# Patient Record
Sex: MALE | Race: WHITE | Employment: OTHER | URBAN - NONMETROPOLITAN AREA
[De-identification: names, ages, dates, MRNs, and addresses within clinical notes are randomized per-mention and may not be internally consistent; named-entity substitution may affect disease eponyms.]

---

## 2017-04-23 ENCOUNTER — TRANSFERRED RECORDS (OUTPATIENT)
Dept: HEALTH INFORMATION MANAGEMENT | Facility: HOSPITAL | Age: 57
End: 2017-04-23

## 2017-04-23 ENCOUNTER — HISTORY (OUTPATIENT)
Dept: EMERGENCY MEDICINE | Facility: OTHER | Age: 57
End: 2017-04-23

## 2017-04-23 ENCOUNTER — AMBULATORY - GICH (OUTPATIENT)
Dept: SCHEDULING | Facility: OTHER | Age: 57
End: 2017-04-23

## 2017-04-25 ENCOUNTER — AMBULATORY - GICH (OUTPATIENT)
Dept: SCHEDULING | Facility: OTHER | Age: 57
End: 2017-04-25

## 2017-04-27 ENCOUNTER — TRANSFERRED RECORDS (OUTPATIENT)
Dept: HEALTH INFORMATION MANAGEMENT | Facility: HOSPITAL | Age: 57
End: 2017-04-27

## 2017-04-27 ENCOUNTER — HOSPITAL ENCOUNTER (EMERGENCY)
Facility: HOSPITAL | Age: 57
Discharge: HOME | End: 2017-04-27
Attending: EMERGENCY MEDICINE | Admitting: EMERGENCY MEDICINE
Payer: COMMERCIAL

## 2017-04-27 VITALS
OXYGEN SATURATION: 96 % | HEART RATE: 98 BPM | SYSTOLIC BLOOD PRESSURE: 111 MMHG | RESPIRATION RATE: 18 BRPM | DIASTOLIC BLOOD PRESSURE: 64 MMHG | TEMPERATURE: 97.7 F | WEIGHT: 151 LBS

## 2017-04-27 DIAGNOSIS — Z72.0 TOBACCO ABUSE: ICD-10-CM

## 2017-04-27 DIAGNOSIS — R58 ECCHYMOSIS: ICD-10-CM

## 2017-04-27 DIAGNOSIS — R06.09 DYSPNEA ON EXERTION: ICD-10-CM

## 2017-04-27 DIAGNOSIS — J44.9 CHRONIC OBSTRUCTIVE PULMONARY DISEASE, UNSPECIFIED COPD TYPE (H): ICD-10-CM

## 2017-04-27 LAB
ALBUMIN SERPL-MCNC: 3.1 G/DL (ref 3.4–5)
ALP SERPL-CCNC: 74 U/L (ref 40–150)
ALT SERPL W P-5'-P-CCNC: 23 U/L (ref 0–70)
ANION GAP SERPL CALCULATED.3IONS-SCNC: 11 MMOL/L (ref 3–14)
AST SERPL W P-5'-P-CCNC: 20 U/L (ref 0–45)
BASE DEFICIT BLDA-SCNC: 1.6 MMOL/L
BASOPHILS # BLD AUTO: 0 10E9/L (ref 0–0.2)
BASOPHILS NFR BLD AUTO: 0.3 %
BILIRUB SERPL-MCNC: 1 MG/DL (ref 0.2–1.3)
BUN SERPL-MCNC: 18 MG/DL (ref 7–30)
CALCIUM SERPL-MCNC: 9.3 MG/DL (ref 8.5–10.1)
CHLORIDE SERPL-SCNC: 101 MMOL/L (ref 94–109)
CO2 SERPL-SCNC: 22 MMOL/L (ref 20–32)
CREAT SERPL-MCNC: 1.28 MG/DL (ref 0.66–1.25)
CRP SERPL-MCNC: 34.1 MG/L (ref 0–8)
D DIMER PPP DDU-MCNC: 553 NG/ML D-DU (ref 0–300)
DIFFERENTIAL METHOD BLD: ABNORMAL
EOSINOPHIL # BLD AUTO: 0 10E9/L (ref 0–0.7)
EOSINOPHIL NFR BLD AUTO: 0.5 %
ERYTHROCYTE [DISTWIDTH] IN BLOOD BY AUTOMATED COUNT: 12.5 % (ref 10–15)
ERYTHROCYTE [SEDIMENTATION RATE] IN BLOOD BY WESTERGREN METHOD: 47 MM/H (ref 0–20)
GFR SERPL CREATININE-BSD FRML MDRD: 58 ML/MIN/1.7M2
GLUCOSE SERPL-MCNC: 100 MG/DL (ref 70–99)
HCO3 BLD-SCNC: 20 MMOL/L (ref 21–28)
HCT VFR BLD AUTO: 39.1 % (ref 40–53)
HGB BLD-MCNC: 13.5 G/DL (ref 13.3–17.7)
IMM GRANULOCYTES # BLD: 0 10E9/L (ref 0–0.4)
IMM GRANULOCYTES NFR BLD: 0.5 %
LYMPHOCYTES # BLD AUTO: 1.3 10E9/L (ref 0.8–5.3)
LYMPHOCYTES NFR BLD AUTO: 14.4 %
MAGNESIUM SERPL-MCNC: 2.3 MG/DL (ref 1.6–2.3)
MCH RBC QN AUTO: 30.9 PG (ref 26.5–33)
MCHC RBC AUTO-ENTMCNC: 34.5 G/DL (ref 31.5–36.5)
MCV RBC AUTO: 90 FL (ref 78–100)
MONOCYTES # BLD AUTO: 0.6 10E9/L (ref 0–1.3)
MONOCYTES NFR BLD AUTO: 7 %
NEUTROPHILS # BLD AUTO: 6.9 10E9/L (ref 1.6–8.3)
NEUTROPHILS NFR BLD AUTO: 77.3 %
NRBC # BLD AUTO: 0 10*3/UL
NRBC BLD AUTO-RTO: 0 /100
NT-PROBNP SERPL-MCNC: 393 PG/ML (ref 0–900)
O2/TOTAL GAS SETTING VFR VENT: ABNORMAL %
OXYHGB MFR BLD: 95 % (ref 92–100)
PCO2 BLD: 26 MM HG (ref 35–45)
PH BLD: 7.49 PH (ref 7.35–7.45)
PLATELET # BLD AUTO: 325 10E9/L (ref 150–450)
PO2 BLD: 74 MM HG (ref 80–105)
POTASSIUM SERPL-SCNC: 4.4 MMOL/L (ref 3.4–5.3)
PROT SERPL-MCNC: 7.5 G/DL (ref 6.8–8.8)
RBC # BLD AUTO: 4.37 10E12/L (ref 4.4–5.9)
SODIUM SERPL-SCNC: 134 MMOL/L (ref 133–144)
TROPONIN I SERPL-MCNC: NORMAL UG/L (ref 0–0.04)
WBC # BLD AUTO: 8.9 10E9/L (ref 4–11)

## 2017-04-27 PROCEDURE — 25000128 H RX IP 250 OP 636: Performed by: EMERGENCY MEDICINE

## 2017-04-27 PROCEDURE — 93005 ELECTROCARDIOGRAM TRACING: CPT

## 2017-04-27 PROCEDURE — 93010 ELECTROCARDIOGRAM REPORT: CPT | Performed by: INTERNAL MEDICINE

## 2017-04-27 PROCEDURE — 86140 C-REACTIVE PROTEIN: CPT | Performed by: EMERGENCY MEDICINE

## 2017-04-27 PROCEDURE — 94640 AIRWAY INHALATION TREATMENT: CPT | Mod: 76

## 2017-04-27 PROCEDURE — 71020 ZZHC CHEST TWO VIEWS, FRONT/LAT: CPT | Mod: TC

## 2017-04-27 PROCEDURE — 83880 ASSAY OF NATRIURETIC PEPTIDE: CPT | Performed by: EMERGENCY MEDICINE

## 2017-04-27 PROCEDURE — 83735 ASSAY OF MAGNESIUM: CPT | Performed by: EMERGENCY MEDICINE

## 2017-04-27 PROCEDURE — 36415 COLL VENOUS BLD VENIPUNCTURE: CPT | Performed by: EMERGENCY MEDICINE

## 2017-04-27 PROCEDURE — 36600 WITHDRAWAL OF ARTERIAL BLOOD: CPT

## 2017-04-27 PROCEDURE — 85379 FIBRIN DEGRADATION QUANT: CPT | Performed by: EMERGENCY MEDICINE

## 2017-04-27 PROCEDURE — 96375 TX/PRO/DX INJ NEW DRUG ADDON: CPT | Mod: 59

## 2017-04-27 PROCEDURE — 25000132 ZZH RX MED GY IP 250 OP 250 PS 637: Performed by: EMERGENCY MEDICINE

## 2017-04-27 PROCEDURE — 84484 ASSAY OF TROPONIN QUANT: CPT | Performed by: EMERGENCY MEDICINE

## 2017-04-27 PROCEDURE — 94640 AIRWAY INHALATION TREATMENT: CPT

## 2017-04-27 PROCEDURE — 82805 BLOOD GASES W/O2 SATURATION: CPT | Performed by: EMERGENCY MEDICINE

## 2017-04-27 PROCEDURE — 25000125 ZZHC RX 250: Performed by: EMERGENCY MEDICINE

## 2017-04-27 PROCEDURE — 71275 CT ANGIOGRAPHY CHEST: CPT | Mod: TC

## 2017-04-27 PROCEDURE — 40000275 ZZH STATISTIC RCP TIME EA 10 MIN

## 2017-04-27 PROCEDURE — 99285 EMERGENCY DEPT VISIT HI MDM: CPT | Performed by: EMERGENCY MEDICINE

## 2017-04-27 PROCEDURE — 94664 DEMO&/EVAL PT USE INHALER: CPT

## 2017-04-27 PROCEDURE — 96374 THER/PROPH/DIAG INJ IV PUSH: CPT | Mod: 59

## 2017-04-27 PROCEDURE — 85652 RBC SED RATE AUTOMATED: CPT | Performed by: EMERGENCY MEDICINE

## 2017-04-27 PROCEDURE — 99285 EMERGENCY DEPT VISIT HI MDM: CPT | Mod: 25

## 2017-04-27 PROCEDURE — 85025 COMPLETE CBC W/AUTO DIFF WBC: CPT | Performed by: EMERGENCY MEDICINE

## 2017-04-27 PROCEDURE — 80053 COMPREHEN METABOLIC PANEL: CPT | Performed by: EMERGENCY MEDICINE

## 2017-04-27 RX ORDER — PREDNISONE 20 MG/1
TABLET ORAL
Qty: 10 TABLET | Refills: 0 | Status: SHIPPED | OUTPATIENT
Start: 2017-04-27 | End: 2017-05-17

## 2017-04-27 RX ORDER — DIAZEPAM 10 MG/2ML
5 INJECTION, SOLUTION INTRAMUSCULAR; INTRAVENOUS ONCE
Status: COMPLETED | OUTPATIENT
Start: 2017-04-27 | End: 2017-04-27

## 2017-04-27 RX ORDER — IOPAMIDOL 755 MG/ML
75 INJECTION, SOLUTION INTRAVASCULAR ONCE
Status: COMPLETED | OUTPATIENT
Start: 2017-04-27 | End: 2017-04-27

## 2017-04-27 RX ORDER — TRAMADOL HYDROCHLORIDE 50 MG/1
50 TABLET ORAL EVERY 8 HOURS PRN
Qty: 20 TABLET | Refills: 0 | Status: SHIPPED | OUTPATIENT
Start: 2017-04-27 | End: 2017-05-17

## 2017-04-27 RX ORDER — IPRATROPIUM BROMIDE AND ALBUTEROL SULFATE 2.5; .5 MG/3ML; MG/3ML
3 SOLUTION RESPIRATORY (INHALATION) ONCE
Status: COMPLETED | OUTPATIENT
Start: 2017-04-27 | End: 2017-04-27

## 2017-04-27 RX ORDER — ALBUTEROL SULFATE 90 UG/1
2 AEROSOL, METERED RESPIRATORY (INHALATION) EVERY 4 HOURS PRN
Qty: 1 INHALER | Refills: 0 | Status: ON HOLD | OUTPATIENT
Start: 2017-04-27 | End: 2017-05-24

## 2017-04-27 RX ORDER — METHYLPREDNISOLONE SODIUM SUCCINATE 125 MG/2ML
125 INJECTION, POWDER, LYOPHILIZED, FOR SOLUTION INTRAMUSCULAR; INTRAVENOUS ONCE
Status: COMPLETED | OUTPATIENT
Start: 2017-04-27 | End: 2017-04-27

## 2017-04-27 RX ORDER — LIDOCAINE 40 MG/G
CREAM TOPICAL
Status: DISCONTINUED | OUTPATIENT
Start: 2017-04-27 | End: 2017-04-27 | Stop reason: HOSPADM

## 2017-04-27 RX ORDER — ALBUTEROL SULFATE 0.83 MG/ML
2.5 SOLUTION RESPIRATORY (INHALATION)
Status: DISCONTINUED | OUTPATIENT
Start: 2017-04-27 | End: 2017-04-27 | Stop reason: HOSPADM

## 2017-04-27 RX ORDER — ALBUTEROL SULFATE 90 UG/1
2 AEROSOL, METERED RESPIRATORY (INHALATION) EVERY 6 HOURS PRN
Status: DISCONTINUED | OUTPATIENT
Start: 2017-04-27 | End: 2017-04-27 | Stop reason: HOSPADM

## 2017-04-27 RX ADMIN — IPRATROPIUM BROMIDE AND ALBUTEROL SULFATE 3 ML: .5; 3 SOLUTION RESPIRATORY (INHALATION) at 14:19

## 2017-04-27 RX ADMIN — METHYLPREDNISOLONE SODIUM SUCCINATE 125 MG: 125 INJECTION, POWDER, FOR SOLUTION INTRAMUSCULAR; INTRAVENOUS at 15:56

## 2017-04-27 RX ADMIN — ALBUTEROL SULFATE 2 PUFF: 90 AEROSOL, METERED RESPIRATORY (INHALATION) at 16:01

## 2017-04-27 RX ADMIN — DIAZEPAM 5 MG: 5 INJECTION, SOLUTION INTRAMUSCULAR; INTRAVENOUS at 14:06

## 2017-04-27 RX ADMIN — IOPAMIDOL 75 ML: 755 INJECTION, SOLUTION INTRAVASCULAR at 15:01

## 2017-04-27 RX ADMIN — ALBUTEROL SULFATE 2.5 MG: 2.5 SOLUTION RESPIRATORY (INHALATION) at 14:19

## 2017-04-27 ASSESSMENT — ENCOUNTER SYMPTOMS
NERVOUS/ANXIOUS: 1
LIGHT-HEADEDNESS: 1
FATIGUE: 1
GASTROINTESTINAL NEGATIVE: 1
AGITATION: 1
DYSPHORIC MOOD: 1
APPETITE CHANGE: 1
CHEST TIGHTNESS: 1
WHEEZING: 1
EYES NEGATIVE: 1
SHORTNESS OF BREATH: 1
BRUISES/BLEEDS EASILY: 1
WEAKNESS: 1
ACTIVITY CHANGE: 1
MYALGIAS: 1

## 2017-04-27 NOTE — ED NOTES
Explained to pt and son that prednisone and inhaler prescriptions were sent to [facility].  Paper prescription given to pt for tramadol.

## 2017-04-27 NOTE — ED PROVIDER NOTES
"  History     Chief Complaint   Patient presents with     Shortness of Breath     [facility] states SOB became much worse today while in shower, statesw \"blackness on my legs is way worse today,\" recent hospitalization in [facility]  Abelardo Escobar is a 56 year old male who was [facility] with the above hx.  He had been referred from [facility] on 4/23 and discharged on 4/25 from [facility].  He had painful ecchymosis on both of his medial thighs, atypical chest pain with elevated trop, and SOB which he attributed to his legs. Bilateral USs were negative for DVTs.  He had ANDREIA and cardiolite stress tests. He had a hyperdynamic LV (EF 85%) and some moderate AS so was placed on metoprolol, prilosec for possible GERD, and thiamine. [facility] at [facility] after this primarily cardiac workup so was referred to our ED for further eval.       I have reviewed the Medications, Allergies, Past Medical and Surgical History, and Social History in the Epic system.    Review of Systems   Constitutional: Positive for activity change, appetite change and fatigue.   HENT: Negative.    Eyes: Negative.    Respiratory: Positive for chest tightness, shortness of breath and wheezing.    Cardiovascular: Positive for chest pain.   Gastrointestinal: Negative.    Genitourinary: Negative.    Musculoskeletal: Positive for myalgias.   Neurological: Positive for weakness and light-headedness.   Hematological: Bruises/bleeds easily.   Psychiatric/Behavioral: Positive for agitation and dysphoric mood. The patient is nervous/anxious.      Physical Exam   BP: 149/57  Heart Rate: 98  Temp: 97.1  F (36.2  C)  Resp: 26  Weight: 68.5 kg (151 lb)  SpO2: 98 %  Physical Exam   Constitutional: He appears well-developed and well-nourished. He appears distressed.   Angry frustrated tanned fellow who appears older than stated age.     HENT:   Head: Normocephalic and " atraumatic.   Eyes: Conjunctivae and EOM are normal. Pupils are equal, round, and reactive to light.   Neck: Normal range of motion. Neck supple.   Cardiovascular: Normal rate and regular rhythm.    Pulmonary/Chest: He is in respiratory distress. He has wheezes.   Abdominal: Soft. Bowel sounds are normal. He exhibits no distension. There is no tenderness. There is no rebound.   Musculoskeletal: Normal range of motion. He exhibits no edema, tenderness or deformity.   Neurological: He is alert.   Skin: Skin is warm. He is not diaphoretic.   Ecchymotic patches along the adductor side of the thighs that do not feel like hematomas but have leeched under the skin somewhat.      Psychiatric:   Cantankerous somewhat combative but because his son was present he usually seemed to gain insight and settle down.      ED Course     ED Course     Procedures  ECG  Sinus rhythm with PACs, otherwise normal ECG, QTc 440 ms  Critical Care time:  none    Labs Ordered and Resulted from Time of ED Arrival Up to the Time of Departure from the ED   CBC WITH PLATELETS DIFFERENTIAL - Abnormal; Notable for the following:        Result Value    RBC Count 4.37 (*)     Hematocrit 39.1 (*)     All other components within normal limits   D-DIMER (FV RANGE) - Abnormal; Notable for the following:     D-Dimer ng/mL 553 (*)     All other components within normal limits   ERYTHROCYTE SEDIMENTATION RATE AUTO - Abnormal; Notable for the following:     Sed Rate 47 (*)     All other components within normal limits   CRP INFLAMMATION - Abnormal; Notable for the following:     CRP Inflammation 34.1 (*)     All other components within normal limits   COMPREHENSIVE METABOLIC PANEL - Abnormal; Notable for the following:     Glucose 100 (*)     Creatinine 1.28 (*)     GFR Estimate 58 (*)     Albumin 3.1 (*)     All other components within normal limits   BLOOD GAS ARTERIAL AND OXYHGB - Abnormal; Notable for the following:     pH Arterial 7.49 (*)     pCO2  Arterial 26 (*)     pO2 Arterial 74 (*)     Bicarbonate Arterial 20 (*)     All other components within normal limits   MAGNESIUM   TROPONIN I   NT PROBNP INPATIENT   PERIPHERAL IV CATHETER     Assessments & Plan (with Medical Decision Making)   Abelardo had a recent thorough efficient 2 day admission for his heart eval re CAD but was not felt to be a candidate on the evidence for a cath.  Noncardiac causes of chest pain were not acted upon.  Today and IV was placed and labs obtained which some acute hyperventilation numbers, d-dimer was elevated at 553 along with ESR of of 47 and CRP of 34.  Creat of 1.28 with GFR of 58 was reassuring.  Elected pulm CTA to rule out pulm embolism which was cleared.  Because of the decreased PEFR both before and after a sequence of nebs, it was felt that his 2+ ppd smoking had finally caught up with him causing his dyspnea.  Solumedrol 125mg IV given along valium 5mg for some attread which seemed to help.  Instructed on albuterol MDI and given follow up prednisone bolus for 5 days.  Tramadol given for his painful leg ecchymoses?? The exact etiology of which are unclear.  He is referred back to the [facility] NP Safia with whom he seems to have bonded. A pulmonary workup including complete PFTs and smoking cessation all would be a good move.   I have reviewed the nursing notes.    I have reviewed the findings, diagnosis, plan and need for follow up with the patient.    New Prescriptions    ALBUTEROL (ALBUTEROL) 108 (90 BASE) MCG/ACT INHALER    Inhale 2 puffs into the lungs every 4 hours as needed for shortness of breath / dyspnea    PREDNISONE (DELTASONE) 20 MG TABLET    Take two tablets (= 40mg) each day for 5 (five) days    TRAMADOL (ULTRAM) 50 MG TABLET    Take 1 tablet (50 mg) by mouth every 8 hours as needed for breakthrough pain or pain maximum 3 tablet(s) per day       Final diagnoses:   Dyspnea on exertion   Chronic obstructive pulmonary disease, unspecified COPD type (H)   Tobacco  abuse   Ecchymosis       4/27/2017   [facility]     Juan Bro MD  04/27/17 8730

## 2017-04-27 NOTE — ED AVS SNAPSHOT
[facility]    [address] [place] [id number]    Phone:  [phone]                                       Abelardo Escobar   MRN: [id number]    Department:  [facility]   Date of Visit:  4/27/2017           After Visit Summary Signature Page     I have received my discharge instructions, and my questions have been answered. I have discussed any challenges I see with this plan with the nurse or doctor.    ..........................................................................................................................................  Patient/Patient Representative Signature      ..........................................................................................................................................  Patient Representative Print Name and Relationship to Patient    ..................................................               ................................................  Date                                            Time    ..........................................................................................................................................  Reviewed by Signature/Title    ...................................................              ..............................................  Date                                                            Time

## 2017-04-27 NOTE — ED NOTES
"[facility] with c/o shortness of breath and increase in bruising bilateral to upper thighs. Pt states that Saturday he noticed the bruising and went to [facility] and was transferred to [facility]. Pt states that \"everything came back normal.\" Pt states he was discharged on Tuesday and the bruising and shortness of breath have increased. CMS intact bilaterally. Pt's chest appears joselito, but pt states that this coloring is \"normal\" for him. Pt is alert and oriented. Pt denies dizziness, chest pain, and n/v/d. Son at bedside.   "

## 2017-04-27 NOTE — ED AVS SNAPSHOT
[facility]    [address] [place] [address]    [place] [address]    Phone:  [phone]                                       Abelardo Escobar   MRN: [id number]    Department:  [facility]   Date of Visit:  4/27/2017           Patient Information     Date Of Birth          1960        Your diagnoses for this visit were:     Dyspnea on exertion     Chronic obstructive pulmonary disease, unspecified COPD type (H)     Tobacco abuse     Ecchymosis        You were seen by Juan Bro MD.      Follow-up Information     Follow up with So Baig    Specialty:  Nurse Practitioner    Why:  As needed    Contact information:    [facility]  [address]  [facility] [address]  [phone]          Discharge Instructions         Surgical Diagnosis of Chest, Lung Problems  You ve been told you need a surgical procedure to diagnose a problem in your chest or lung. Surgical procedures are used to get large samples of tissue or lymph nodes from the chest or lung. These samples allow for more complete testing. Surgical procedures typically require incisions and may take some time to recover from.     With mediastinoscopy, the scope enters the mediastinum through an incision in the neck.            A sample of mass can be taken using video-assisted thoracic surgery (VATS). In some cases, a mass or part of the lung is removed if cancer is found.      Mediastinoscopy  Mediastinoscopy allows the doctor to see inside the mediastinum (area between the two lungs) and remove large lymph node samples (biopsy). First, an incision is made at the base of the neck. A scope is passed through the incision down into the mediastinum. Then a biopsy instrument is passed through the center of the scope. Once the lymph node sample is taken, the instrument is pulled up through the scope. The sample is then tested for cancer or other problems.  Video-assisted thoracic surgery  Video-assisted thoracic  surgery (VATS) allows the doctor to see inside the chest and take tissue samples. VATS is done using a thoracoscope (instrument with a light, lens, and camera). First, an incision is made on the side of the chest. The scope is passed through the incision into the pleural space (between the lungs and chest wall). Images of inside the chest are sent to a monitor viewed by the doctor. Other small incisions are made for instruments to pass through and remove tissue. VATS can also help diagnose and stage cancer.  Thoracotomy  In certain cases, open surgery is needed to diagnose and treat a lung or chest problem. If so, incisions are made and the chest is opened. This allows the doctor to see inside the chest and take a sample of lung tissue or a mass.  Preparing for the procedure  Before your procedure, do the following:    Follow your doctor s instructions about eating and drinking.    Tell your doctor about the medications you take. You may need to stop taking certain medications before the procedure, especially aspirin, Coumadin, or other blood thinners.    Discuss any allergies and health problems with your doctor.    Tell your doctor if you are pregnant.  During the procedure  You receive general anesthesia (medication to make you sleep) during the procedure. Once you are asleep, incisions are made in the neck, chest, side, or back to allow the doctor to view the area or take a biopsy if needed. A tube placed in the chest during surgery drains fluid.     Risks and complications    Hoarseness    Bleeding    Infection    Abnormal heart rate    Pneumothorax (collapsed lung)    Injury to other structures in the chest    Respiratory failure (rare)    Nerve damage    Death (rare)     2947-8345 [employer]. [address] [place] [address], [place] [address]. All rights reserved. This information is not intended as a substitute for professional medical care. Always follow your healthcare professional's  instructions.      Abelardo,  We have checked out reasons for you being short of breath including possible blood clots from your deep veins to your lungs (none were identified) to reviewing the heart records from [facility] which largely ruled that out for now especially coronary artery blockage that might otherwise lead to a heart attack.  What we did find was severe COPD (chronic obstuctive lung disease) most likely due to your life long addiction to tobacco and all the crud therein.  Hopefully, the steroid medicine will help along with inhalers and you cutting down to nothing as soon as possible.  Work with Safia on this problem--you are a young nida and don't want to live like this if you have some control over it.  The blood marks from the apparently spontaneous hemorrhage into your skin, may spread for a while but it will take at least 2-3 weeks for them to fade.  Warm baths or moist heat should help resolve these.  Good luck.        Review of your medicines      START taking        Dose / Directions Last dose taken    albuterol 108 (90 BASE) MCG/ACT Inhaler   Commonly known as:  albuterol   Dose:  2 puff   Quantity:  1 Inhaler        Inhale 2 puffs into the lungs every 4 hours as needed for shortness of breath / dyspnea   Refills:  0        predniSONE 20 MG tablet   Commonly known as:  DELTASONE   Quantity:  10 tablet        Take two tablets (= 40mg) each day for 5 (five) days   Refills:  0        traMADol 50 MG tablet   Commonly known as:  ULTRAM   Dose:  50 mg   Quantity:  20 tablet        Take 1 tablet (50 mg) by mouth every 8 hours as needed for breakthrough pain or pain maximum 3 tablet(s) per day   Refills:  0          Our records show that you are taking the medicines listed below. If these are incorrect, please call your family doctor or clinic.        Dose / Directions Last dose taken    LISINOPRIL PO   Dose:  40 mg        Take 40 mg by mouth daily   Refills:  0        METOPROLOL SUCCINATE ER  PO   Dose:  50 mg        Take 50 mg by mouth daily   Refills:  0        OMEPRAZOLE PO   Dose:  20 mg        Take 20 mg by mouth every morning   Refills:  0        THIAMINE HCL PO   Dose:  100 mg        Take 100 mg by mouth daily   Refills:  0                Prescriptions were sent or printed at these locations (3 Prescriptions)                   [id number] [address] [id number]    [address] , [place] [address]    Telephone:  [phone]   Fax:  [phone]   Hours:                  E-Prescribed (2 of 3)         albuterol (ALBUTEROL) 108 (90 BASE) MCG/ACT Inhaler               predniSONE (DELTASONE) 20 MG tablet                 Printed at Department/Unit printer (1 of 3)         traMADol (ULTRAM) 50 MG tablet                Procedures and tests performed during your visit     Blood gas arterial and oxyhgb    CBC with platelets differential    CRP inflammation    CTA Angiogram Chest w/o & w Contrast    Comprehensive metabolic panel    D-Dimer (FV Range)    EKG 12-lead, tracing only    Erythrocyte sedimentation rate auto    Magnesium    Nt probnp inpatient (BNP)    Peripheral IV catheter    Respiratory therapy to instruct    Troponin I    XR Chest 2 Views      Orders Needing Specimen Collection     None      Pending Results     Date and Time Order Name Status Description    4/27/2017 1413 CTA Angiogram Chest w/o & w Contrast Preliminary     4/27/2017 1348 XR Chest 2 Views In process             Pending Culture Results     No orders found from 4/25/2017 to 4/28/2017.            Thank you for choosing [facility]       Thank you for choosing [facility] for your care. Our goal is always to provide you with excellent care. Hearing back from our patients is one way we can continue to improve our services. Please take a few minutes to complete the written survey that you may receive in the mail after you visit with us. Thank you!        [employer] Information     [employer] lets you send messages to your doctor,  "view your test results, renew your prescriptions, schedule appointments and more. To sign up, go to [email or web address] . Click on \"Log in\" on the left side of the screen, which will take you to the Welcome page. Then click on \"Sign up Now\" on the right side of the page.     You will be asked to enter the access code listed below, as well as some personal information. Please follow the directions to create your username and password.     Your access code is: [email or web address]  Expires: 2017  3:57 PM     Your access code will  in 90 days. If you need help or a new code, please call your [place] clinic or [phone].        Care EveryWhere ID     This is your Care EveryWhere ID. This could be used by other organizations to access your [place] medical records  [id number]        After Visit Summary       This is your record. Keep this with you and show to your community pharmacist(s) and doctor(s) at your next visit.                  "

## 2017-04-27 NOTE — DISCHARGE INSTRUCTIONS
Surgical Diagnosis of Chest, Lung Problems  You ve been told you need a surgical procedure to diagnose a problem in your chest or lung. Surgical procedures are used to get large samples of tissue or lymph nodes from the chest or lung. These samples allow for more complete testing. Surgical procedures typically require incisions and may take some time to recover from.     With mediastinoscopy, the scope enters the mediastinum through an incision in the neck.            A sample of mass can be taken using video-assisted thoracic surgery (VATS). In some cases, a mass or part of the lung is removed if cancer is found.      Mediastinoscopy  Mediastinoscopy allows the doctor to see inside the mediastinum (area between the two lungs) and remove large lymph node samples (biopsy). First, an incision is made at the base of the neck. A scope is passed through the incision down into the mediastinum. Then a biopsy instrument is passed through the center of the scope. Once the lymph node sample is taken, the instrument is pulled up through the scope. The sample is then tested for cancer or other problems.  Video-assisted thoracic surgery  Video-assisted thoracic surgery (VATS) allows the doctor to see inside the chest and take tissue samples. VATS is done using a thoracoscope (instrument with a light, lens, and camera). First, an incision is made on the side of the chest. The scope is passed through the incision into the pleural space (between the lungs and chest wall). Images of inside the chest are sent to a monitor viewed by the doctor. Other small incisions are made for instruments to pass through and remove tissue. VATS can also help diagnose and stage cancer.  Thoracotomy  In certain cases, open surgery is needed to diagnose and treat a lung or chest problem. If so, incisions are made and the chest is opened. This allows the doctor to see inside the chest and take a sample of lung tissue or a mass.  Preparing for the  procedure  Before your procedure, do the following:    Follow your doctor s instructions about eating and drinking.    Tell your doctor about the medications you take. You may need to stop taking certain medications before the procedure, especially aspirin, Coumadin, or other blood thinners.    Discuss any allergies and health problems with your doctor.    Tell your doctor if you are pregnant.  During the procedure  You receive general anesthesia (medication to make you sleep) during the procedure. Once you are asleep, incisions are made in the neck, chest, side, or back to allow the doctor to view the area or take a biopsy if needed. A tube placed in the chest during surgery drains fluid.     Risks and complications    Hoarseness    Bleeding    Infection    Abnormal heart rate    Pneumothorax (collapsed lung)    Injury to other structures in the chest    Respiratory failure (rare)    Nerve damage    Death (rare)     9929-7904 [employer]. [address] [place], [place] [address]. All rights reserved. This information is not intended as a substitute for professional medical care. Always follow your healthcare professional's instructions.      Abelardo,  We have checked out reasons for you being short of breath including possible blood clots from your deep veins to your lungs (none were identified) to reviewing the heart records from [facility] which largely ruled that out for now especially coronary artery blockage that might otherwise lead to a heart attack.  What we did find was severe COPD (chronic obstuctive lung disease) most likely due to your life long addiction to tobacco and all the crud therein.  Hopefully, the steroid medicine will help along with inhalers and you cutting down to nothing as soon as possible.  Work with Safia on this problem--you are a young nida and don't want to live like this if you have some control over it.  The blood marks from the apparently spontaneous  hemorrhage into your skin, may spread for a while but it will take at least 2-3 weeks for them to fade.  Warm baths or moist heat should help resolve these.  Good luck.

## 2017-04-28 NOTE — PROGRESS NOTES
CT Angiogram of the Chest report faxed to PCPTOOTIE NP at [facility]. Impression -  CALCIFIED NODULE IN THE RIGHT UPPER LOBE WHICH IS BENIGN.  Follow up as directed.

## 2017-05-17 ENCOUNTER — HOSPITAL ENCOUNTER (INPATIENT)
Facility: HOSPITAL | Age: 57
LOS: 7 days | Discharge: HOME HEALTH | DRG: 829 | End: 2017-05-24
Attending: FAMILY MEDICINE | Admitting: INTERNAL MEDICINE
Payer: COMMERCIAL

## 2017-05-17 DIAGNOSIS — M62.81 GENERALIZED MUSCLE WEAKNESS: ICD-10-CM

## 2017-05-17 DIAGNOSIS — M33.13 DERMATOMYOSITIS (H): Primary | ICD-10-CM

## 2017-05-17 DIAGNOSIS — J44.0 EMPHYSEMA WITH BOTH ACUTE AND CHRONIC BRONCHITIS (H): ICD-10-CM

## 2017-05-17 DIAGNOSIS — D62 ANEMIA DUE TO BLOOD LOSS, ACUTE: ICD-10-CM

## 2017-05-17 DIAGNOSIS — J43.9 EMPHYSEMA WITH BOTH ACUTE AND CHRONIC BRONCHITIS (H): ICD-10-CM

## 2017-05-17 DIAGNOSIS — G89.18 ACUTE POST-OPERATIVE PAIN: ICD-10-CM

## 2017-05-17 DIAGNOSIS — J20.9 EMPHYSEMA WITH BOTH ACUTE AND CHRONIC BRONCHITIS (H): ICD-10-CM

## 2017-05-17 PROBLEM — D50.0 BLOOD LOSS ANEMIA: Status: ACTIVE | Noted: 2017-05-17

## 2017-05-17 LAB
ABO + RH BLD: NORMAL
ABO + RH BLD: NORMAL
ALBUMIN SERPL-MCNC: 2.3 G/DL (ref 3.4–5)
ALP SERPL-CCNC: 71 U/L (ref 40–150)
ALT SERPL W P-5'-P-CCNC: 11 U/L (ref 0–70)
ANION GAP SERPL CALCULATED.3IONS-SCNC: 11 MMOL/L (ref 3–14)
AST SERPL W P-5'-P-CCNC: 11 U/L (ref 0–45)
BASOPHILS # BLD AUTO: 0 10E9/L (ref 0–0.2)
BASOPHILS NFR BLD AUTO: 0.3 %
BILIRUB SERPL-MCNC: 1.4 MG/DL (ref 0.2–1.3)
BLD PROD TYP BPU: NORMAL
BLD PROD TYP BPU: NORMAL
BLD UNIT ID BPU: 0
BLD UNIT ID BPU: 0
BLOOD PRODUCT CODE: NORMAL
BLOOD PRODUCT CODE: NORMAL
BPU ID: NORMAL
BPU ID: NORMAL
BUN SERPL-MCNC: 24 MG/DL (ref 7–30)
CALCIUM SERPL-MCNC: 8.4 MG/DL (ref 8.5–10.1)
CHLORIDE SERPL-SCNC: 105 MMOL/L (ref 94–109)
CO2 SERPL-SCNC: 22 MMOL/L (ref 20–32)
CREAT SERPL-MCNC: 1.09 MG/DL (ref 0.66–1.25)
DIFFERENTIAL METHOD BLD: ABNORMAL
EOSINOPHIL # BLD AUTO: 0 10E9/L (ref 0–0.7)
EOSINOPHIL NFR BLD AUTO: 0.1 %
ERYTHROCYTE [DISTWIDTH] IN BLOOD BY AUTOMATED COUNT: 15.6 % (ref 10–15)
GFR SERPL CREATININE-BSD FRML MDRD: 70 ML/MIN/1.7M2
GLUCOSE SERPL-MCNC: 121 MG/DL (ref 70–99)
HCT VFR BLD AUTO: 21.1 % (ref 40–53)
HEMOCCULT STL QL IA: POSITIVE
HGB BLD-MCNC: 6.6 G/DL (ref 13.3–17.7)
IMM GRANULOCYTES # BLD: 0.1 10E9/L (ref 0–0.4)
IMM GRANULOCYTES NFR BLD: 0.6 %
INR PPP: 1.17 (ref 0.8–1.2)
LACTATE SERPL-SCNC: 2.3 MMOL/L (ref 0.4–2)
LYMPHOCYTES # BLD AUTO: 0.7 10E9/L (ref 0.8–5.3)
LYMPHOCYTES NFR BLD AUTO: 8.8 %
MAGNESIUM SERPL-MCNC: 2.1 MG/DL (ref 1.6–2.3)
MCH RBC QN AUTO: 30 PG (ref 26.5–33)
MCHC RBC AUTO-ENTMCNC: 31.3 G/DL (ref 31.5–36.5)
MCV RBC AUTO: 96 FL (ref 78–100)
MONOCYTES # BLD AUTO: 0.4 10E9/L (ref 0–1.3)
MONOCYTES NFR BLD AUTO: 5 %
NEUTROPHILS # BLD AUTO: 6.7 10E9/L (ref 1.6–8.3)
NEUTROPHILS NFR BLD AUTO: 85.2 %
NRBC # BLD AUTO: 0 10*3/UL
NRBC BLD AUTO-RTO: 0 /100
NT-PROBNP SERPL-MCNC: 2904 PG/ML (ref 0–900)
PLATELET # BLD AUTO: 249 10E9/L (ref 150–450)
POTASSIUM SERPL-SCNC: 4.1 MMOL/L (ref 3.4–5.3)
PROT SERPL-MCNC: 6.1 G/DL (ref 6.8–8.8)
RBC # BLD AUTO: 2.2 10E12/L (ref 4.4–5.9)
SODIUM SERPL-SCNC: 138 MMOL/L (ref 133–144)
SPECIMEN EXP DATE BLD: NORMAL
TRANSFUSION STATUS PATIENT QL: NORMAL
WBC # BLD AUTO: 7.9 10E9/L (ref 4–11)

## 2017-05-17 PROCEDURE — 99285 EMERGENCY DEPT VISIT HI MDM: CPT | Performed by: FAMILY MEDICINE

## 2017-05-17 PROCEDURE — 82274 ASSAY TEST FOR BLOOD FECAL: CPT | Performed by: FAMILY MEDICINE

## 2017-05-17 PROCEDURE — 86901 BLOOD TYPING SEROLOGIC RH(D): CPT | Performed by: FAMILY MEDICINE

## 2017-05-17 PROCEDURE — 36430 TRANSFUSION BLD/BLD COMPNT: CPT

## 2017-05-17 PROCEDURE — 30233N1 TRANSFUSION OF NONAUTOLOGOUS RED BLOOD CELLS INTO PERIPHERAL VEIN, PERCUTANEOUS APPROACH: ICD-10-PCS | Performed by: FAMILY MEDICINE

## 2017-05-17 PROCEDURE — 93010 ELECTROCARDIOGRAM REPORT: CPT | Performed by: INTERNAL MEDICINE

## 2017-05-17 PROCEDURE — 86900 BLOOD TYPING SEROLOGIC ABO: CPT | Performed by: FAMILY MEDICINE

## 2017-05-17 PROCEDURE — 80053 COMPREHEN METABOLIC PANEL: CPT | Performed by: FAMILY MEDICINE

## 2017-05-17 PROCEDURE — 83605 ASSAY OF LACTIC ACID: CPT | Performed by: INTERNAL MEDICINE

## 2017-05-17 PROCEDURE — 25000125 ZZHC RX 250: Performed by: INTERNAL MEDICINE

## 2017-05-17 PROCEDURE — 99223 1ST HOSP IP/OBS HIGH 75: CPT | Performed by: INTERNAL MEDICINE

## 2017-05-17 PROCEDURE — 85610 PROTHROMBIN TIME: CPT | Performed by: FAMILY MEDICINE

## 2017-05-17 PROCEDURE — P9016 RBC LEUKOCYTES REDUCED: HCPCS | Performed by: FAMILY MEDICINE

## 2017-05-17 PROCEDURE — 71020 ZZHC CHEST TWO VIEWS, FRONT/LAT: CPT | Mod: TC

## 2017-05-17 PROCEDURE — 85018 HEMOGLOBIN: CPT | Performed by: INTERNAL MEDICINE

## 2017-05-17 PROCEDURE — 20000003 ZZH R&B ICU

## 2017-05-17 PROCEDURE — 36415 COLL VENOUS BLD VENIPUNCTURE: CPT | Performed by: INTERNAL MEDICINE

## 2017-05-17 PROCEDURE — 85025 COMPLETE CBC W/AUTO DIFF WBC: CPT | Performed by: FAMILY MEDICINE

## 2017-05-17 PROCEDURE — 93005 ELECTROCARDIOGRAM TRACING: CPT

## 2017-05-17 PROCEDURE — 36415 COLL VENOUS BLD VENIPUNCTURE: CPT | Performed by: FAMILY MEDICINE

## 2017-05-17 PROCEDURE — 83880 ASSAY OF NATRIURETIC PEPTIDE: CPT | Performed by: FAMILY MEDICINE

## 2017-05-17 PROCEDURE — 25000132 ZZH RX MED GY IP 250 OP 250 PS 637: Performed by: INTERNAL MEDICINE

## 2017-05-17 PROCEDURE — 86920 COMPATIBILITY TEST SPIN: CPT | Performed by: FAMILY MEDICINE

## 2017-05-17 PROCEDURE — 99285 EMERGENCY DEPT VISIT HI MDM: CPT | Mod: 25

## 2017-05-17 PROCEDURE — 83735 ASSAY OF MAGNESIUM: CPT | Performed by: FAMILY MEDICINE

## 2017-05-17 PROCEDURE — 25000128 H RX IP 250 OP 636: Performed by: FAMILY MEDICINE

## 2017-05-17 PROCEDURE — 40000275 ZZH STATISTIC RCP TIME EA 10 MIN

## 2017-05-17 PROCEDURE — 86850 RBC ANTIBODY SCREEN: CPT | Performed by: FAMILY MEDICINE

## 2017-05-17 PROCEDURE — 96374 THER/PROPH/DIAG INJ IV PUSH: CPT

## 2017-05-17 RX ORDER — BISACODYL 10 MG
10 SUPPOSITORY, RECTAL RECTAL DAILY PRN
Status: DISCONTINUED | OUTPATIENT
Start: 2017-05-17 | End: 2017-05-24 | Stop reason: HOSPADM

## 2017-05-17 RX ORDER — ONDANSETRON 2 MG/ML
4 INJECTION INTRAMUSCULAR; INTRAVENOUS ONCE
Status: COMPLETED | OUTPATIENT
Start: 2017-05-17 | End: 2017-05-17

## 2017-05-17 RX ORDER — ONDANSETRON 4 MG/1
4 TABLET, ORALLY DISINTEGRATING ORAL EVERY 6 HOURS PRN
Status: DISCONTINUED | OUTPATIENT
Start: 2017-05-17 | End: 2017-05-24 | Stop reason: HOSPADM

## 2017-05-17 RX ORDER — NALOXONE HYDROCHLORIDE 0.4 MG/ML
.1-.4 INJECTION, SOLUTION INTRAMUSCULAR; INTRAVENOUS; SUBCUTANEOUS
Status: DISCONTINUED | OUTPATIENT
Start: 2017-05-17 | End: 2017-05-24 | Stop reason: HOSPADM

## 2017-05-17 RX ORDER — SODIUM CHLORIDE 9 MG/ML
INJECTION, SOLUTION INTRAVENOUS CONTINUOUS
Status: DISCONTINUED | OUTPATIENT
Start: 2017-05-17 | End: 2017-05-17

## 2017-05-17 RX ORDER — ONDANSETRON 2 MG/ML
4 INJECTION INTRAMUSCULAR; INTRAVENOUS EVERY 6 HOURS PRN
Status: DISCONTINUED | OUTPATIENT
Start: 2017-05-17 | End: 2017-05-24 | Stop reason: HOSPADM

## 2017-05-17 RX ORDER — ACETAMINOPHEN 325 MG/1
650 TABLET ORAL EVERY 4 HOURS PRN
Status: DISCONTINUED | OUTPATIENT
Start: 2017-05-17 | End: 2017-05-24 | Stop reason: HOSPADM

## 2017-05-17 RX ORDER — LANOLIN ALCOHOL/MO/W.PET/CERES
100 CREAM (GRAM) TOPICAL DAILY
Status: DISCONTINUED | OUTPATIENT
Start: 2017-05-17 | End: 2017-05-24 | Stop reason: HOSPADM

## 2017-05-17 RX ORDER — ALBUTEROL SULFATE 90 UG/1
2 AEROSOL, METERED RESPIRATORY (INHALATION) EVERY 4 HOURS PRN
Status: DISCONTINUED | OUTPATIENT
Start: 2017-05-17 | End: 2017-05-24 | Stop reason: HOSPADM

## 2017-05-17 RX ORDER — HYDROCODONE BITARTRATE AND ACETAMINOPHEN 5; 325 MG/1; MG/1
1-2 TABLET ORAL EVERY 4 HOURS PRN
Status: DISCONTINUED | OUTPATIENT
Start: 2017-05-17 | End: 2017-05-24 | Stop reason: HOSPADM

## 2017-05-17 RX ADMIN — PANTOPRAZOLE SODIUM 40 MG: 40 INJECTION, POWDER, FOR SOLUTION INTRAVENOUS at 19:29

## 2017-05-17 RX ADMIN — ONDANSETRON 4 MG: 2 INJECTION, SOLUTION INTRAMUSCULAR; INTRAVENOUS at 15:49

## 2017-05-17 RX ADMIN — HYDROCODONE BITARTRATE AND ACETAMINOPHEN 1 TABLET: 5; 325 TABLET ORAL at 18:52

## 2017-05-17 ASSESSMENT — ENCOUNTER SYMPTOMS
WEAKNESS: 1
NERVOUS/ANXIOUS: 1
CHEST TIGHTNESS: 0
SHORTNESS OF BREATH: 1
ABDOMINAL PAIN: 0
FEVER: 0
FATIGUE: 1
ACTIVITY CHANGE: 1
WHEEZING: 0
BACK PAIN: 1
DYSPHORIC MOOD: 1
HEADACHES: 0

## 2017-05-17 ASSESSMENT — PAIN DESCRIPTION - DESCRIPTORS: DESCRIPTORS: BURNING

## 2017-05-17 NOTE — ED NOTES
Nausea and vomiting x 1 week. Dizzy. Abnormal bruising from groin to knees. States this bruising has been there for a month. C/o difficulty breathing.

## 2017-05-17 NOTE — IP AVS SNAPSHOT
MRN:[id number]                      After Visit Summary   5/17/2017    Abelardo Escobar    MRN: [id number]           Thank you!     Thank you for choosing [facility] for your care. Our goal is always to provide you with excellent care. Hearing back from our patients is one way we can continue to improve our services. Please take a few minutes to complete the written survey that you may receive in the mail after you visit with us. Thank you!        Patient Information     Date Of Birth          1960        Designated Caregiver       Most Recent Value    Caregiver    Will someone help with your care after discharge? no      About your hospital stay     You were admitted on:  May 17, 2017 You last received care in the:  [facility]    You were discharged on:  May 24, 2017        Reason for your hospital stay       This is a 56-year-old man who presented with subacute exertional dyspnea and fatigue.  On initial evaluation, outpatient clinic, hemoglobin of 6.6 was noted with  The patient reporting intermittent dark stools.  Evaluation has included esophagogastroduodenoscopy, which was unrevealing.  He has as well  Pain in muscles on medial and during this hospitalization right lateral thighs.  Magnetic resonance imaging highly suggestive of dermatomyositis, although on repeat evaluation.  CK is not elevated.  Multiple serologies obtained with results pending.  JAYLA at low titer. Muscle biopsy obtained.  5/23 with pathology pending.  He is discharged with plans to continue home physical therapy as well as rheumatology follow-up.  In addition preemptive treatment for chronic obstructive pulmonary disease with imaging showing radiographic emphysema.                  Who to Call     For medical emergencies, please call [phone].  For non-urgent questions about your medical care, please call your primary care provider or clinic, [phone]  For questions related to your surgery, please call your  surgery clinic        Attending Provider     Provider Specialty    Paulette Landa MD Emergency Medicine    Cm, Jayjay CARDENAS MD Internal Medicine    Fredo Thacker MD Internal Medicine    Tim Perez MD Internal Medicine       Primary Care Provider Office Phone # Fax #    So JORDY Baig [phone]       [facility] [address]   [facility] [id number]        After Care Instructions     Activity       Your activity upon discharge: activity as tolerated            Diet       Follow this diet upon discharge: Orders Placed This Encounter      Regular Diet Adult                  Follow-up Appointments     Follow-up and recommended labs and tests        Follow up with primary care provider, So Baig, within 7 days for hospital follow- up.   Subspecialty rheumatology evaluation recommended.  Consider pulmonary function testing in evaluation of airflow obstruction  Home physical therapy.  Given limitation in walking and generalized weakness            Follow-up and recommended labs and tests        Follow up with Dr. Schaefer in 7-14 days                  Your next 10 appointments already scheduled     Jun 13, 2017 11:30 AM CDT   (Arrive by 11:15 AM)   Return Visit with Lauri Schaefer,    [facility] ([facility] [place] [facility])    [address] [place] [id number]   [phone]              Further instructions from your care team       [facility] [place] [facility]   You have an appointment on June 13th at 11:15AM with Dr. Schaefer at the [facility] for a wound check.    Wound dressing instructions:  Remove the bandage tomorrow.  At that point you  may shower. There are steri-stirps over the wounds.  Its okay to shower with these on, do not scrub.  Just let the soapy water run over the incisions and pat dry.  The steri-strips will fall off on their own in approximately 2 weeks. Do not immerse  "incision in water for two weeks.  No baths, hot tubs, pools, rivers, lakes, oceans, etc. Watch for fevers greater than 101.3, pain uncontrolled by pain narcotics, deep red skin around the incision and puss coming out of the incision.     Pending Results     Date and Time Order Name Status Description    2017 1305 ARUP Miscellaneous Test In process     2017 0529 Polymyositis and Dermatomyositis Panel In process             Statement of Approval     Ordered          17 1118  I have reviewed and agree with all the recommendations and orders detailed in this document.  EFFECTIVE NOW     Approved and electronically signed by:  Tim Perez MD             Admission Information     Date & Time Provider Department Dept. Phone    2017 Tim Perez MD [facility] [phone]      Your Vitals Were     Blood Pressure Pulse Temperature Respirations Height Weight    121/48 (BP Location: Left arm) 75 98.5  F (36.9  C) (Tympanic) 18 1.676 m (5' 6\") 67.8 kg (149 lb 7.6 oz)    Pulse Oximetry BMI (Body Mass Index)                97% 24.13 kg/m2          MyChart Information     [employer] lets you send messages to your doctor, view your test results, renew your prescriptions, schedule appointments and more. To sign up, go to [email or web address] . Click on \"Log in\" on the left side of the screen, which will take you to the Welcome page. Then click on \"Sign up Now\" on the right side of the page.     You will be asked to enter the access code listed below, as well as some personal information. Please follow the directions to create your username and password.     Your access code is: [email or web address]  Expires: 2017  3:57 PM     Your access code will  in 90 days. If you need help or a new code, please call your [place] clinic or [phone].        Care EveryWhere ID     This is your Care EveryWhere ID. This could be used by other organizations to access your [place] medical " records  [id number]           Review of your medicines      START taking        Dose / Directions    HYDROcodone-acetaminophen 5-325 MG per tablet   Commonly known as:  NORCO   Used for:  Dermatomyositis (H), Acute post-operative pain        Dose:  1-2 tablet   Take 1-2 tablets by mouth every 4 hours as needed for moderate to severe pain   Quantity:  15 tablet   Refills:  0       Ipratropium-Albuterol  MCG/ACT inhaler   Commonly known as:  COMBIVENT RESPIMAT   Used for:  Emphysema with both acute and chronic bronchitis (H)        Dose:  1 puff   Inhale 1 puff into the lungs 4 times daily as needed for shortness of breath / dyspnea or wheezing Not to exceed 6 doses per day.   Quantity:  1 Inhaler   Refills:  1         CONTINUE these medicines which have NOT CHANGED        Dose / Directions    METOPROLOL TARTRATE PO        Dose:  50 mg   Take 50 mg by mouth daily   Refills:  0       OMEPRAZOLE PO        Dose:  20 mg   Take 20 mg by mouth every morning Take on an empty stomach, at least 30 minutes prior to a meal for stomach acid.   Refills:  0       THIAMINE HCL PO        Dose:  100 mg   Take 100 mg by mouth daily   Refills:  0       traMADol 50 MG tablet   Commonly known as:  ULTRAM        Dose:  50 mg   Take 50 mg by mouth every 8 hours as needed for moderate pain   Refills:  0         STOP taking     albuterol 108 (90 BASE) MCG/ACT Inhaler   Commonly known as:  albuterol                Where to get your medicines      Some of these will need a paper prescription and others can be bought over the counter. Ask your nurse if you have questions.     Bring a paper prescription for each of these medications     HYDROcodone-acetaminophen 5-325 MG per tablet    Ipratropium-Albuterol  MCG/ACT inhaler                Protect others around you: Learn how to safely use, store and throw away your medicines at www.disposemymeds.org.             Medication List: This is a list of all your medications and when to take  them. Check marks below indicate your daily home schedule. Keep this list as a reference.      Medications           Morning Afternoon Evening Bedtime As Needed    HYDROcodone-acetaminophen 5-325 MG per tablet   Commonly known as:  NORCO   Take 1-2 tablets by mouth every 4 hours as needed for moderate to severe pain   Last time this was given:  2 tablets on 5/24/2017  5:30 AM                                Ipratropium-Albuterol  MCG/ACT inhaler   Commonly known as:  COMBIVENT RESPIMAT   Inhale 1 puff into the lungs 4 times daily as needed for shortness of breath / dyspnea or wheezing Not to exceed 6 doses per day.                                METOPROLOL TARTRATE PO   Take 50 mg by mouth daily   Last time this was given:  50 mg on 5/18/2017  2:08 PM                                OMEPRAZOLE PO   Take 20 mg by mouth every morning Take on an empty stomach, at least 30 minutes prior to a meal for stomach acid.                                THIAMINE HCL PO   Take 100 mg by mouth daily   Last time this was given:  100 mg on 5/24/2017 10:03 AM                                traMADol 50 MG tablet   Commonly known as:  ULTRAM   Take 50 mg by mouth every 8 hours as needed for moderate pain   Last time this was given:  50 mg on 5/23/2017  6:31 AM

## 2017-05-17 NOTE — H&P
DATE OF SERVICE:  05/17/2017      CHIEF COMPLAINT:  Short of breath and weakness.      HISTORY OF PRESENT ILLNESS:  Mr. Abelardo Escobar is a 56-year-old man.  The patient reports that in the last 2 months he has noticed the insidious onset of shortness of breath and weakness, more noticeable when he moves around, better with resting.  The patient also noticed in the last several weeks intermittent dark stools.  [facility], had a low hemoglobin and was transferred to [facility] for evaluation.  There he was seen and found to be anemic with hypochromic normocytic anemia and was started on a blood transfusion.  The patient also reports that about 2 months ago he noticed a pimple-like lesion on the right groin.  Patient squeezed this and brownish material came out.  The wound seemed to heal.  He is not sure if this is related to bruising which developed several weeks later and has persisted for the last several weeks, bruising is most noticeable in the right upper thigh and also the left thigh.      PAST MEDICAL HISTORY:   1.  Peptic ulcer disease in 1979, resolved with medical management.   2.  Hypertension.   3.  Chronic thiamine therapy.   4.  Suspect chronic alcohol dependence.      MEDICATIONS ON PRESENTATION:   1.  Lisinopril 20 mg p.o. q. day.   2.  Metoprolol 50 mg p.o. q. day.   3.  Omeprazole 20 mg q.a.m.   4.  Thiamine 100 mg p.o. q. day.   5.  Albuterol p.r.n.      ALLERGIES:  Adverse reactions to codeine.      FAMILY HISTORY:  The patient reports a history of CVA and MIs in his family.      SOCIAL HISTORY:  The patient reports he drinks at least 3-4 beers per day, and smokes a pack per day, quitting on Saturday, which would be 4 days prior to presentation.      REVIEW OF SYSTEMS:  Other than discussed in the HPI above, noncontributory on comprehensive revie.     In the outside clinic, patient's blood pressure was 78/50, heart rate in the 90s,  oxygen sats were reported in the 70s.  The patient was seen by EMS and was put on oxygen at 3 liters with sats improved to 98.  The patient's blood pressure improved to 128/60, seemingly after being given fluids, but this has not been documented.      PHYSICAL EXAMINATION:   When I walked in the room it had the distinct smell of melena  GENERAL:  The patient is resting in bed in no acute distress.   HEENT:  He looks chronically unwell.   SKIN:  Bruising over the right groin and right upper thigh, and also left upper thigh.  Skin is warm, nondiaphoretic.   NEUROLOGIC:  Alert, oriented.  No focal neuro deficits.     PSYCHIATRIC:  The patient is somewhat terse in answering questions, otherwise pleasant affect.   PRECORDIUM:  Regular rhythm, normal S1, S2.   LUNGS:  Resonant and clear.   ABDOMEN:  Soft, nontender, bowel sounds present.  No peritoneal signs.      LABORATORY DATA:  The patient's hemoglobin is 6.6 with a normal MCV and MCHC, which is just below normal at 31.3, white cell count 7.9, platelet count is 249, down from 325 just a month previous.  INR 1.17.  The patient's BUN is 24 and creatinine 1.09.  The patient has a low albumin, low total protein at 2.3 and 6.1 respectively.  The patient's BNP is elevated compared to previous at 2900 compared to 390.      IMPRESSION:    1.  Strongly suspect GI bleeding.  Also suspect chronic liver disease with impairment in coagulation proteins and platelet dysfunction, no family history of inheritable disorders of coagulation.  Plan will be to transfuse with blood, start PPI and avoid anticoagulants, consider surgery for endoscopy.     2.  Suspect alcohol dependence. No evidence of withdrawal.   3.  Smoking, counseled on cessation.   4.  Hypotension, hemorraghic shock, acute blood loss anemia, plan to hold antihypertensives, blood transfusion.   5.  Code status discussed in the emergency room and wishes to be full code.   6.  Prophylaxis for deep venous thrombosis.   Mechanical with no anticoagulants given the above.         TOAN MAHAJAN MD             D: 2017 18:08   T: 2017 18:48   MT: EVAN      Name:     JIM TREJO   MRN:      [id number]        Account:      [id number]   :      1960           Admitted:     [id number]      Document: [id number]

## 2017-05-17 NOTE — ED NOTES
DATE:  5/17/2017   TIME OF RECEIPT FROM LAB:  0418  LAB TEST:  Hgb  LAB VALUE:  6.6  RESULTS GIVEN WITH READ-BACK TO (PROVIDER):  Dr. Freda Magana  TIME LAB VALUE REPORTED TO PROVIDER:   5354

## 2017-05-17 NOTE — ED NOTES
Bed: ED07  Expected date: 5/17/17  Expected time:   Means of arrival: Ambulance  Comments:  Nausea/Vomiting

## 2017-05-17 NOTE — ED NOTES
States he unable to walk r/t pain in his right leg initially and now both legs too painful for ambulation. States he has not been caring for himself at home r/t unable to walk. Both inner thighs from top of knees to groin are purple.

## 2017-05-17 NOTE — IP AVS SNAPSHOT
[facility]    [address] [place] [id number]    Phone:  [phone]    Fax:  [phone]                                       After Visit Summary   5/17/2017    Abelardo Escobar    MRN: [id number]           After Visit Summary Signature Page     I have received my discharge instructions, and my questions have been answered. I have discussed any challenges I see with this plan with the nurse or doctor.    ..........................................................................................................................................  Patient/Patient Representative Signature      ..........................................................................................................................................  Patient Representative Print Name and Relationship to Patient    ..................................................               ................................................  Date                                            Time    ..........................................................................................................................................  Reviewed by Signature/Title    ...................................................              ..............................................  Date                                                            Time

## 2017-05-17 NOTE — ED PROVIDER NOTES
"  History     Chief Complaint   Patient presents with     Shortness of Breath      nausea and vomiting. shortness of breath. hx COPD. c/o abnormal bruising on the legs.      HPI  Abelardo Escobar is a 56 year old male who came to the ED initially for shortness of breath.  He has COPD and continues to smoke.  He also had nausea and vomiting.  That had stopped by the time he arrived in the ED.  His biggest worry is that he had bruising on his legs and he is weak and cannot walk or take care of himself.  [facility] and [facility].  He and his sister are complaining about the lack of diagnoses from other facilities.  Sister is convinced he has a clot somewhere and says \"there is a lot of carotid disease in our family\".  He is disheveled and looks as though he has been having difficulty caring for himself, extremely poor dentition, appears much older than his chronological age.    I have reviewed the Medications, Allergies, Past Medical and Surgical History, and Social History in the Epic system.    Review of Systems   Constitutional: Positive for activity change and fatigue. Negative for fever.   HENT: Negative.         Did hit his head when he fell yesterday, but denies any headache, neck pain or issues from that.   Respiratory: Positive for shortness of breath. Negative for chest tightness and wheezing.    Cardiovascular: Negative for chest pain.   Gastrointestinal: Negative for abdominal pain.   Genitourinary: Negative.    Musculoskeletal: Positive for back pain and gait problem.   Skin:        Bruising easily, large bruises on both thighs, deep purple bruise on right mons pubis.   Neurological: Positive for weakness. Negative for headaches.   Psychiatric/Behavioral: Positive for dysphoric mood. The patient is nervous/anxious.        Physical Exam   BP: 117/90  Heart Rate: 77  Temp: 98  F (36.7  C)  Resp: 18  SpO2: 100 %  Physical Exam   Constitutional: He is oriented to person, place, " and time. He appears well-developed and well-nourished. No distress.   HENT:   Head: Normocephalic. Head is without raccoon's eyes, without Crabtree's sign and without laceration.   Mouth/Throat: Oropharynx is clear and moist. Abnormal dentition.   Multiple missing teeth.   Eyes: EOM are normal. Pupils are equal, round, and reactive to light.   Neck: Normal range of motion. Neck supple. No JVD present.   Cardiovascular: Normal rate, regular rhythm and intact distal pulses.    Murmur heard.   Systolic murmur is present with a grade of 2/6   Pulmonary/Chest: Effort normal and breath sounds normal. No respiratory distress. He has no wheezes.   Abdominal: Soft. Bowel sounds are normal. He exhibits no distension. There is no tenderness.   Musculoskeletal: Normal range of motion. He exhibits no edema or tenderness.   Lymphadenopathy:     He has no cervical adenopathy.   Neurological: He is alert and oriented to person, place, and time.   Skin: Skin is warm and dry.   Bronze skin color.   Psychiatric: Judgment normal. His affect is angry and blunt. Cognition and memory are normal. He exhibits a depressed mood.   Nursing note and vitals reviewed.      ED Course     ED Course     Procedures             EKG Interpretation:      Interpreted by Paulette Magana  Time reviewed: 1513  Symptoms at time of EKG: dyspnea   Rhythm: normal sinus   Rate: Normal  Axis: Normal  Ectopy: none  Conduction: prolonged QT  ST Segments/ T Waves: Non-specific ST-T wave changes  Q Waves: none  Comparison to prior: prolonged QT and ST-T changes are new since last EKG    Clinical Impression: no acute changes and non-specific EKG      Labs Ordered and Resulted from Time of ED Arrival Up to the Time of Departure from the ED   CBC WITH PLATELETS DIFFERENTIAL - Abnormal; Notable for the following:        Result Value    RBC Count 2.20 (*)     Hemoglobin 6.6 (*)     Hematocrit 21.1 (*)     MCHC 31.3 (*)     RDW 15.6 (*)     Absolute Lymphocytes  0.7 (*)     All other components within normal limits   COMPREHENSIVE METABOLIC PANEL - Abnormal; Notable for the following:     Glucose 121 (*)     Calcium 8.4 (*)     Bilirubin Total 1.4 (*)     Albumin 2.3 (*)     Protein Total 6.1 (*)     All other components within normal limits   NT PROBNP INPATIENT - Abnormal; Notable for the following:     N-Terminal Pro BNP Inpatient 2904 (*)     All other components within normal limits   OCCULT BLOOD FECAL HGB IMMUNO - Abnormal; Notable for the following:     Occult Blood HGB FIT Positive (*)     All other components within normal limits   INR   MAGNESIUM   UA MACROSCOPIC WITH REFLEX TO MICRO AND CULTURE   VITAL SIGNS   PULSE OXIMETRY NURSING   CARDIAC CONTINUOUS MONITORING   PERIPHERAL IV CATHETER   RED BLOOD CELL PREPARE ORDER UNIT   ABO/RH TYPE AND SCREEN   ABO AND RH   BLOOD COMPONENT   BLOOD COMPONENT       Assessments & Plan (with Medical Decision Making)   Patient is markedly anemic with a hemoglobin of 6.6, normocytic.  He has extensive bruising, but a normal INR and platelets.  He has not had any hematemesis or seen any blood in his stool, GUAIAC pending.  Dyspnea is explained by his anemia, no evidence of lung pathology on CXR.  Bilirubin mildly elevated and albumin low, otherwise chemistries are normal.  Patient will need admission for transfusion and further workup of his anemia.  Admit to medicine, Dr. Pabon admitting physician.    I have reviewed the nursing notes.    I have reviewed the findings, diagnosis, plan and need for follow up with the patient.    New Prescriptions    No medications on file       Final diagnoses:   Anemia due to blood loss, acute   Generalized muscle weakness       5/17/2017   [facility]     Paulette Landa MD  05/17/17 4612
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
## 2017-05-18 ENCOUNTER — APPOINTMENT (OUTPATIENT)
Dept: PHYSICAL THERAPY | Facility: HOSPITAL | Age: 57
DRG: 829 | End: 2017-05-18
Payer: COMMERCIAL

## 2017-05-18 LAB
ABO + RH BLD: NORMAL
ABO + RH BLD: NORMAL
ALBUMIN SERPL-MCNC: 2.2 G/DL (ref 3.4–5)
ALBUMIN UR-MCNC: 10 MG/DL
ALP SERPL-CCNC: 70 U/L (ref 40–150)
ALT SERPL W P-5'-P-CCNC: 10 U/L (ref 0–70)
ANION GAP SERPL CALCULATED.3IONS-SCNC: 6 MMOL/L (ref 3–14)
APPEARANCE UR: CLEAR
AST SERPL W P-5'-P-CCNC: 15 U/L (ref 0–45)
BACTERIA #/AREA URNS HPF: ABNORMAL /HPF
BASOPHILS # BLD AUTO: 0 10E9/L (ref 0–0.2)
BASOPHILS NFR BLD AUTO: 0.4 %
BILIRUB SERPL-MCNC: 2 MG/DL (ref 0.2–1.3)
BILIRUB UR QL STRIP: NEGATIVE
BLD GP AB SCN SERPL QL: NORMAL
BLD PROD TYP BPU: NORMAL
BLOOD BANK CMNT PATIENT-IMP: NORMAL
BUN SERPL-MCNC: 20 MG/DL (ref 7–30)
CALCIUM SERPL-MCNC: 7.8 MG/DL (ref 8.5–10.1)
CHLORIDE SERPL-SCNC: 106 MMOL/L (ref 94–109)
CO2 SERPL-SCNC: 26 MMOL/L (ref 20–32)
COLOR UR AUTO: ABNORMAL
CREAT SERPL-MCNC: 1.02 MG/DL (ref 0.66–1.25)
DIFFERENTIAL METHOD BLD: ABNORMAL
EOSINOPHIL # BLD AUTO: 0.1 10E9/L (ref 0–0.7)
EOSINOPHIL NFR BLD AUTO: 1.4 %
ERYTHROCYTE [DISTWIDTH] IN BLOOD BY AUTOMATED COUNT: 15.5 % (ref 10–15)
GFR SERPL CREATININE-BSD FRML MDRD: 76 ML/MIN/1.7M2
GLUCOSE SERPL-MCNC: 87 MG/DL (ref 70–99)
GLUCOSE UR STRIP-MCNC: 30 MG/DL
HCT VFR BLD AUTO: 25.9 % (ref 40–53)
HCT VFR BLD AUTO: 27.7 % (ref 40–53)
HGB BLD-MCNC: 8.4 G/DL (ref 13.3–17.7)
HGB BLD-MCNC: 8.5 G/DL (ref 13.3–17.7)
HGB BLD-MCNC: 9.2 G/DL (ref 13.3–17.7)
HGB UR QL STRIP: NEGATIVE
IMM GRANULOCYTES # BLD: 0 10E9/L (ref 0–0.4)
IMM GRANULOCYTES NFR BLD: 0.4 %
INR PPP: 1.06 (ref 0.8–1.2)
KETONES UR STRIP-MCNC: NEGATIVE MG/DL
LACTATE SERPL-SCNC: 1.3 MMOL/L (ref 0.4–2)
LEUKOCYTE ESTERASE UR QL STRIP: NEGATIVE
LYMPHOCYTES # BLD AUTO: 1.4 10E9/L (ref 0.8–5.3)
LYMPHOCYTES NFR BLD AUTO: 24.2 %
MCH RBC QN AUTO: 29.9 PG (ref 26.5–33)
MCHC RBC AUTO-ENTMCNC: 32.8 G/DL (ref 31.5–36.5)
MCV RBC AUTO: 91 FL (ref 78–100)
MONOCYTES # BLD AUTO: 0.4 10E9/L (ref 0–1.3)
MONOCYTES NFR BLD AUTO: 6.2 %
MUCOUS THREADS #/AREA URNS LPF: PRESENT /LPF
NEUTROPHILS # BLD AUTO: 3.8 10E9/L (ref 1.6–8.3)
NEUTROPHILS NFR BLD AUTO: 67.4 %
NITRATE UR QL: NEGATIVE
NRBC # BLD AUTO: 0 10*3/UL
NRBC BLD AUTO-RTO: 0 /100
NUM BPU REQUESTED: 2
PH UR STRIP: 5.5 PH (ref 4.7–8)
PLATELET # BLD AUTO: 200 10E9/L (ref 150–450)
POTASSIUM SERPL-SCNC: 3.9 MMOL/L (ref 3.4–5.3)
PROT SERPL-MCNC: 5.5 G/DL (ref 6.8–8.8)
RBC # BLD AUTO: 2.84 10E12/L (ref 4.4–5.9)
RBC #/AREA URNS AUTO: 1 /HPF (ref 0–2)
SODIUM SERPL-SCNC: 138 MMOL/L (ref 133–144)
SP GR UR STRIP: 1.02 (ref 1–1.03)
SPECIMEN EXP DATE BLD: NORMAL
URN SPEC COLLECT METH UR: ABNORMAL
UROBILINOGEN UR STRIP-MCNC: 2 MG/DL (ref 0–2)
WBC # BLD AUTO: 5.7 10E9/L (ref 4–11)
WBC #/AREA URNS AUTO: 1 /HPF (ref 0–2)

## 2017-05-18 PROCEDURE — 85610 PROTHROMBIN TIME: CPT | Performed by: INTERNAL MEDICINE

## 2017-05-18 PROCEDURE — 25000132 ZZH RX MED GY IP 250 OP 250 PS 637: Performed by: INTERNAL MEDICINE

## 2017-05-18 PROCEDURE — 12000000 ZZH R&B MED SURG/OB

## 2017-05-18 PROCEDURE — 80053 COMPREHEN METABOLIC PANEL: CPT | Performed by: INTERNAL MEDICINE

## 2017-05-18 PROCEDURE — 81241 F5 GENE: CPT | Performed by: INTERNAL MEDICINE

## 2017-05-18 PROCEDURE — 36415 COLL VENOUS BLD VENIPUNCTURE: CPT | Performed by: INTERNAL MEDICINE

## 2017-05-18 PROCEDURE — 85014 HEMATOCRIT: CPT | Performed by: INTERNAL MEDICINE

## 2017-05-18 PROCEDURE — 72197 MRI PELVIS W/O & W/DYE: CPT | Mod: TC

## 2017-05-18 PROCEDURE — A9585 GADOBUTROL INJECTION: HCPCS | Mod: TC

## 2017-05-18 PROCEDURE — 94640 AIRWAY INHALATION TREATMENT: CPT

## 2017-05-18 PROCEDURE — 99222 1ST HOSP IP/OBS MODERATE 55: CPT | Performed by: SURGERY

## 2017-05-18 PROCEDURE — 83605 ASSAY OF LACTIC ACID: CPT | Performed by: INTERNAL MEDICINE

## 2017-05-18 PROCEDURE — 40000193 ZZH STATISTIC PT WARD VISIT

## 2017-05-18 PROCEDURE — 85025 COMPLETE CBC W/AUTO DIFF WBC: CPT | Performed by: INTERNAL MEDICINE

## 2017-05-18 PROCEDURE — 99232 SBSQ HOSP IP/OBS MODERATE 35: CPT | Performed by: INTERNAL MEDICINE

## 2017-05-18 PROCEDURE — 85018 HEMOGLOBIN: CPT | Performed by: INTERNAL MEDICINE

## 2017-05-18 PROCEDURE — 97162 PT EVAL MOD COMPLEX 30 MIN: CPT | Mod: GP

## 2017-05-18 PROCEDURE — 81001 URINALYSIS AUTO W/SCOPE: CPT | Performed by: INTERNAL MEDICINE

## 2017-05-18 RX ORDER — PANTOPRAZOLE SODIUM 40 MG/1
40 TABLET, DELAYED RELEASE ORAL ONCE
Status: COMPLETED | OUTPATIENT
Start: 2017-05-18 | End: 2017-05-18

## 2017-05-18 RX ORDER — DIPHENHYDRAMINE HCL 25 MG
25 CAPSULE ORAL EVERY 6 HOURS PRN
Status: DISCONTINUED | OUTPATIENT
Start: 2017-05-18 | End: 2017-05-24 | Stop reason: HOSPADM

## 2017-05-18 RX ORDER — METOPROLOL TARTRATE 50 MG
50 TABLET ORAL DAILY
Status: DISCONTINUED | OUTPATIENT
Start: 2017-05-18 | End: 2017-05-19

## 2017-05-18 RX ORDER — PANTOPRAZOLE SODIUM 40 MG/1
40 TABLET, DELAYED RELEASE ORAL EVERY MORNING
Status: DISCONTINUED | OUTPATIENT
Start: 2017-05-19 | End: 2017-05-24 | Stop reason: HOSPADM

## 2017-05-18 RX ORDER — PANTOPRAZOLE SODIUM 40 MG/1
40 TABLET, DELAYED RELEASE ORAL EVERY MORNING
Status: DISCONTINUED | OUTPATIENT
Start: 2017-05-18 | End: 2017-05-18

## 2017-05-18 RX ORDER — TRAMADOL HYDROCHLORIDE 50 MG/1
50 TABLET ORAL EVERY 8 HOURS PRN
COMMUNITY

## 2017-05-18 RX ORDER — TRAMADOL HYDROCHLORIDE 50 MG/1
50 TABLET ORAL EVERY 8 HOURS PRN
Status: DISCONTINUED | OUTPATIENT
Start: 2017-05-18 | End: 2017-05-24 | Stop reason: HOSPADM

## 2017-05-18 RX ORDER — DIPHENHYDRAMINE HYDROCHLORIDE 50 MG/ML
25 INJECTION INTRAMUSCULAR; INTRAVENOUS EVERY 6 HOURS PRN
Status: DISCONTINUED | OUTPATIENT
Start: 2017-05-18 | End: 2017-05-24 | Stop reason: HOSPADM

## 2017-05-18 RX ORDER — GADOBUTROL 604.72 MG/ML
7.5 INJECTION INTRAVENOUS ONCE
Status: COMPLETED | OUTPATIENT
Start: 2017-05-18 | End: 2017-05-18

## 2017-05-18 RX ADMIN — METOPROLOL TARTRATE 50 MG: 50 TABLET, FILM COATED ORAL at 14:08

## 2017-05-18 RX ADMIN — GADOBUTROL 7.5 ML: 604.72 INJECTION INTRAVENOUS at 12:10

## 2017-05-18 RX ADMIN — HYDROCODONE BITARTRATE AND ACETAMINOPHEN 1 TABLET: 5; 325 TABLET ORAL at 14:10

## 2017-05-18 RX ADMIN — Medication 100 MG: at 08:33

## 2017-05-18 RX ADMIN — FLUTICASONE FUROATE AND VILANTEROL TRIFENATATE 1 PUFF: 100; 25 POWDER RESPIRATORY (INHALATION) at 15:49

## 2017-05-18 RX ADMIN — DIPHENHYDRAMINE HYDROCHLORIDE 25 MG: 25 CAPSULE ORAL at 14:09

## 2017-05-18 RX ADMIN — PANTOPRAZOLE SODIUM 40 MG: 40 TABLET, DELAYED RELEASE ORAL at 18:01

## 2017-05-18 RX ADMIN — HYDROCODONE BITARTRATE AND ACETAMINOPHEN 1 TABLET: 5; 325 TABLET ORAL at 11:05

## 2017-05-18 ASSESSMENT — PAIN DESCRIPTION - DESCRIPTORS
DESCRIPTORS: ACHING
DESCRIPTORS: ACHING;ITCHING

## 2017-05-18 NOTE — PLAN OF CARE
[facility] Inpatient Admission Note:    Patient admitted to 3128/3128-1 at approximately 1805 via bed accompanied by transport tech from emergency room . Report received from Xenia in SBAR format at 1800 via telephone. Patient transferred to bed via self.. Patient is alert and oriented X 3, reports pain; rates at 7 on 0-10 scale.  Patient oriented to room, unit, hourly rounding, and plan of care. Explained admission packet and patient handbook with patient bill of rights brochure. Will continue to monitor and document as needed.     Inpatient Nursing criteria listed below was met:      Health care directives status obtained and documented: Yes      Care Everywhere authorization obtained Yes      MRSA swab completed for patient 65 years and older: N/A      Patient identifies a surrogate decision maker: Yes If yes, who:Fortino Contact Information:[phone]      Core Measure diagnosis present:No.       If initial lactic acid >2.0, repeat lactic acid drawn within one hour of arrival to unit: NA.      Vaccination assessment and education completed: Yes   Vaccinations received prior to admission: Pneumovax no  Influenza(seasonal)  NO   Vaccination(s) ordered: patient declines      Clergy visit ordered if patient requests: No      Skin issues/needs documented: N/A      Isolation Patient: no      Fall Prevention Yes: Care plan updated, education given and documented, sticker and magnet in place: Yes      Care Plan initiated: Yes      Education Documented (including assessment): Yes      Patient has discharge needs : No    Family notified of pt moving to ICU, kathy Freitas. Face to face report given with opportunity to observe patient.    Report given to Rachel Ramos   5/18/2017  1053

## 2017-05-18 NOTE — PLAN OF CARE
Problem: Goal Outcome Summary  Goal: Goal Outcome Summary  Outcome: Therapy, progress toward functional goals is gradual  Patient received 2nd unit of blood. HGB Recheck at 8.4  BP 's 80's - 90's /60  MAP 70's. He denies pain. He c/o some itching - No rash noted. Lactic acid was 2.3 MD aware. No s/s of bleeding. No changes to Thighs. He remains 96% via RA.  RR 15-22.

## 2017-05-18 NOTE — CONSULTS
Surgery Consult  Note      RE: Abelardo Escobar  : 1960  RADHA: 2017      Chief Complaint:  Abdominal pain    History of Present Illness:  Mr. Escobar is a very pleasant 56 year old year old male who I am seeing at the request of Dr. Perez for evaluation of abdominal pain and muscle pain and to make further recommendations.  Has been to three different hospitals over the last month with epigastric pain, heart burn, black stools.  Patient reports no intervention.  Presented to the ER hgb 6.6.  He also complains of a progressively worsening of SBO, weakness and leg pain with bruising of his right leg. MRI of the lower extremity showed dermatomyositis.  Rheumatology was consulted and they requested a muscle biopsy.  I am consulted for EGD and muscle biopsy.      Medical history:  PUD  HTN  Chronic thiamine   Alcohol dependence     Surgical history:  none    Family history:  Family history of CVA, MI    Medications:  1. Lisinopril 20 mg p.o. q. day.   2. Metoprolol 50 mg p.o. q. day.   3. Omeprazole 20 mg q.a.m.   4. Thiamine 100 mg p.o. q. day.   5. Albuterol p.r.n.     Allergies:  The patientis allergic to bee venom and codeine.  .  Social history:  Social History   Substance Use Topics     Smoking status: Not on file     Smokeless tobacco: Not on file     Alcohol use Not on file     Marital status: .  Review of Systems:    Constitutional: Per HPI  HENT: Negative for ear pain, nosebleeds, congestion, sore throat, tinnitus and ear discharge.    Eyes: Negative for blurred vision, double vision, photophobia and pain.   Respiratory: Negative for cough, hemoptysis, shortness of breath, wheezing and stridor.    Cardiovascular: Negative for chest pain, palpitations and orthopnea.   Gastrointestinal: Per HPI   Genitourinary: Negative for urgency, frequency and hematuria.   Musculoskeletal: Negative for myalgias, back pain and joint pain.   Neurological: Negative for tingling, speech change and headaches.  "  Endo/Heme/Allergies: Does not bruise/bleed easily.   Psychiatric/Behavioral: Negative for depression, suicidal ideas and hallucinations. The patient is not nervous/anxious.    Physical Examination:  /55 (BP Location: Left arm)  Pulse 71  Temp 98.7  F (37.1  C) (Tympanic)  Resp 16  Ht 1.676 m (5' 6\")  Wt 67.8 kg (149 lb 7.6 oz)  SpO2 98%  BMI 24.13 kg/m2  General: AAOx4, NAD, WN/WD, ambulating without assistance  HEENT:NCAT, EOMI, PERRL Sclerae anicteric; Trachea mideline, no JVD  Chest:   Clear to auscultation bilaterally.  Cardiac: S1S2 , regular rate and rhythm without additional sounds  Abdomen: Soft, ND/NT no rebound, no guarding  Extremities: Bruising over the right groin and right upper thigh, and also left upper thigh. Skin is warm, nondiaphoretic.   Skin: Warm, dry, < 2 sec cap refill  Neuro: CN 2-12 grossly intact, no focal deficit, GCS 15  Psych: happy, calm, asks appropriate questions      Assessment/Plan:  #1 Anemia  #2 GERD  #3 Dermatomyositis      The indications, risks, benefits and technical aspects of esophagogastroduodenoscopy were reviewed with her questions asked and answered.  Antral biopsy for histologic examination will be performed and the place of H. pylori in gastritis was discussed.  Preoperative preparation, npo after midnight preceding the date was discussed and a request made to hold aspirin containing agents one week prior to ameliorate antiplatelet effect.  Questions asked and answered - will proceed based on scheduling availability.    The majority of the muscle inflammation and skin changes are on the right lower extremity.  We will biopsy this in accordance for pathologies procedures.  This needs to be coordinated to ensure the specimen is adequate. The risks, including but not limited to, bleeding, infection, recurrence, need for further resection, wound healing, scar formation and chronic pain have all been discussed with the patient. He voiced understanding of all " these risks and there were no barriers to patient education.          Dr Schaefer  [facility]  [address], [address]  [place]    [address]    Referring Provider:  No referring provider defined for this encounter.     Primary Care Provider:  So Baig

## 2017-05-18 NOTE — PLAN OF CARE
Patient has $ 92.00,checkbook, wallet with ID, pocket knife . 2 nurses and patient counted money and looked up in closet in rm [phone] per pt request. Nurse was going to call security to lock up patients items. Patient chose to keep  locked up in room.

## 2017-05-18 NOTE — PROGRESS NOTES
05/18/17 1500   Quick Adds   Type of Visit Initial PT Evaluation   Living Environment   Lives With alone   Living Arrangements mobile home   Home Accessibility stairs to enter home   Number of Stairs to Enter Home 4   Number of Stairs Within Home 0   Self-Care   Usual Activity Tolerance fair   Current Activity Tolerance poor   Regular Exercise no   Equipment Currently Used at Home none   Functional Level Prior   Ambulation 0-->independent   Transferring 0-->independent   Toileting 0-->independent   Fall history within last six months yes   Number of times patient has fallen within last six months 1   Which of the above functional risks had a recent onset or change? ambulation;transferring;toileting;fall history   Prior Functional Level Comment Reports he lives at home independently in [place]. Reports he has been declining for about 1 month. He has not been walking much and when he did he needed to hold onto furniture. Reports he has not been able to get into his tub or shower so he has not been bathing. Reports he does not drive but gets rides to town a couple times a month. Reports he does not get any assistance at home   General Information   Onset of Illness/Injury or Date of Surgery - Date 05/17/17   Referring Physician Dr. Perez   Pertinent History of Current Problem (include personal factors and/or comorbidities that impact the POC) Hospitalized c anemia and weakness. He was just d/c from ICU today. PMH includes HTN, chronic thiamine therapy and suspicion of chronic alcohol dependence   Precautions/Limitations fall precautions   Weight-Bearing Status - LLE full weight-bearing   Weight-Bearing Status - RLE full weight-bearing   Cognitive Status Examination   Orientation orientation to person, place and time   Level of Consciousness alert   Follows Commands and Answers Questions 100% of the time   Personal Safety and Judgment intact   Memory intact   Pain Assessment   Patient Currently in Pain Yes, see Vital  Sign flowsheet  (C/o bilateral calf pain, R worse than L)   Posture    Posture Protracted shoulders;Forward head position   Range of Motion (ROM)   ROM Quick Adds No deficits were identified   Strength   Strength Comments Weakness BLE. Grossly 3+ to 4-/5   Bed Mobility   Bed Mobility Bed mobility skill: Sit to supine   Bed Mobility Skill: Sit to Supine   Level of [place]: Sit/Supine minimum assist (75% patients effort)   Physical Assist/Nonphysical Assist: Sit/Supine 1 person assist   Assistive Device: Sit/Supine bed rails   Transfer Skills   Transfer Transfer Skill: Sit to Stand   Transfer Skill:  Sit to Stand   Level of [place]: Sit/Stand minimum assist (75% patients effort)   Physical Assist/Nonphysical Assist: Sit/Stand 1 person assist   Weightbearing Restrictions: Sit/Stand full weight-bearing   Assistive Device for Transfer: Sit/Stand rolling walker   Gait   Gait Gait Skill   Gait Skills   Level of [place]: Gait minimum assist (75% patients effort)   Physical Assist/Nonphysical Assist: Gait 1 person assist   Weight-Bearing Restrictions: Gait full weight-bearing   Assistive Device for Transfer: Gait rolling walker   Gait Distance 25 feet   Coordination   Coordination no deficits were identified   Muscle Tone   Muscle Tone no deficits were identified   Modality Interventions   Planned Modality Interventions Cryotherapy   General Therapy Interventions   Planned Therapy Interventions bed mobility training;balance training;gait training;strengthening;transfer training;home program guidelines;progressive activity/exercise   Clinical Impression   Criteria for Skilled Therapeutic Intervention yes, treatment indicated   PT Diagnosis Difficulty c walking, weakness   Influenced by the following impairments Weakness, impaired gait, decreased activity tolerance, pain limiting function, fall risk   Functional limitations due to impairments Decreased safety and independence c functional mobility tasks  "  Clinical Presentation Evolving/Changing   Clinical Presentation Rationale PMH, just d/c from ICU   Clinical Decision Making (Complexity) Moderate complexity   Therapy Frequency` daily   Predicted Duration of Therapy Intervention (days/wks) 7 days   Anticipated Equipment Needs at Discharge walker   Anticipated Discharge Disposition Transitional Care Facility;Acute Rehabilitation Facility   Risk & Benefits of therapy have been explained Yes   Patient, Family & other staff in agreement with plan of care Yes   [employer] [facility] AM-PAC TM \"6 Clicks\"   2016, Trustees of [facility], under license to [employer].  All rights reserved.   6 Clicks Short Forms Basic Mobility Inpatient Short Form   [facility] AM-PAC  \"6 Clicks\" V.2 Basic Mobility Inpatient Short Form   1. Turning from your back to your side while in a flat bed without using bedrails? 4 - None   2. Moving from lying on your back to sitting on the side of a flat bed without using bedrails? 3 - A Little   3. Moving to and from a bed to a chair (including a wheelchair)? 3 - A Little   4. Standing up from a chair using your arms (e.g., wheelchair, or bedside chair)? 3 - A Little   5. To walk in hospital room? 3 - A Little   6. Climbing 3-5 steps with a railing? 2 - A Lot   Basic Mobility Raw Score (Score out of 24.Lower scores equate to lower levels of function) 18   Total Evaluation Time   Total Evaluation Time (Minutes) 15     "

## 2017-05-18 NOTE — PLAN OF CARE
"0906: Transferred pt up to chair, pt needed assist X2 with gait belt to make it to the chair from bed. Pt did get slightly short of breath with short ambulation but stated \"I'm feeling better than I was\". States the SOB is less than it was at home. Also reports pain in R calf when ambulating, pt had a hard time putting weight on that calf. Reporting BLE itching. Pt states that at home he has a hard time getting around his house and has to hang on to something the whole time. States he has passed out at home from the SOB and pain in his leg.     "

## 2017-05-18 NOTE — PLAN OF CARE
Problem: Patient Goal: Rt Focus  Goal: 1. Patient Goal: RT Focus  [facility] - Respiratory Clinical Assessment     Current Patient History:    Respiratory History: no history of pneumonia or bronchitis    Smoking History: years of smoking >40    Oxygen dependency: No,    Oxygen prescribed: n/a     5/17/2017 7:16 PM Patient Initial Assessment:     Level of Consciousness: alert , cooperative    Skin color: pink    Lung sounds:clear to diminished    Respirations:  Normal with easy respirations and no use of accessory muscles to breathe    Respiratory symptoms: no other unusual symptoms    Cough/Sputum: nonproductive    Current oxygenation status: room air

## 2017-05-18 NOTE — PLAN OF CARE
Face to face report given with opportunity to observe patient.    Report given to CODY Macdonald   5/18/2017  7:20 AM

## 2017-05-18 NOTE — PLAN OF CARE
Problem: Goal Outcome Summary  Goal: Goal Outcome Summary  GAIT/AMBULATION:  25ft c min A and FWW. Very slow, limited by pain and fatigue  EQUIPMENT NEEDS AT DISCHARGE: FWW  D/C RECOMMENDATIONS: TCU  TREATMENT AT D/C: Continued PT  COMMENTS: PT evaluation completed. D/C recommendation at this time is transitional care facility due to weakness, decreased activity tolerance and risk for falling. Plan to treat pt daily during his stay.

## 2017-05-18 NOTE — PROGRESS NOTES
[facility]'s [facility] of care.   Mentioned that patient will need to follow up with a Rheumatologist. If [facility] does not [facility] then he will need to go to see [facility] for [facility].    [facility] to [facility] when [facility] is [facility] from [facility].   [phone] fax

## 2017-05-18 NOTE — PLAN OF CARE
Problem: Goal Outcome Summary  Goal: Goal Outcome Summary  Outcome: Improving  Pt presented with VSS, rating pain max of 5/10 in BLE legs which was controlled well with Norco. Pt is alert/oriented but at times forgetful. NPO until afternoon Hgb was drawn which resulted at 9.2. Pt then able to eat, tolerating regular diet well. Pt's BLE leg bruising unchanged throughout the day. Pt states he thinks it extended down his posterior left leg but looks unchanged to this writer. No s/s of active GI bleeding today. No stools. MRI of pelvis and both femurs done. Fluids encouraged after MRI contrast and NPO status changed. Both IV's saline locked and patent. Will continue to monitor.      Temp: 98.7  F (37.1  C) Temp src: Tympanic BP: 104/55 Pulse: 71 Heart Rate: 68 Resp: 16 SpO2: 98 % O2 Device: None (Room air)

## 2017-05-18 NOTE — PLAN OF CARE
Multi-disciplinary Care Rounds were held today 8:30 AM.     Pt no longer ICU status per Dr. Perez.

## 2017-05-18 NOTE — PROGRESS NOTES
[facility] - Respiratory Clinical Assessment    Current Patient History:    Respiratory History: no history of pneumonia or bronchitis    Smoking History: years of smoking >40    Oxygen dependency: No,    Oxygen prescribed: n/a    5/17/2017 7:16 PM Patient Initial Assessment:     Level of Consciousness: alert , cooperative    Skin color: pink    Lung sounds:clear to diminished    Respirations:  Normal with easy respirations and no use of accessory muscles to breathe    Respiratory symptoms: no other unusual symptoms    Cough/Sputum:  nonproductive  Current oxygenation status: room air

## 2017-05-19 ENCOUNTER — ANESTHESIA (OUTPATIENT)
Dept: SURGERY | Facility: HOSPITAL | Age: 57
DRG: 829 | End: 2017-05-19
Payer: COMMERCIAL

## 2017-05-19 ENCOUNTER — APPOINTMENT (OUTPATIENT)
Dept: PHYSICAL THERAPY | Facility: HOSPITAL | Age: 57
DRG: 829 | End: 2017-05-19
Payer: COMMERCIAL

## 2017-05-19 ENCOUNTER — ANESTHESIA EVENT (OUTPATIENT)
Dept: SURGERY | Facility: HOSPITAL | Age: 57
DRG: 829 | End: 2017-05-19
Payer: COMMERCIAL

## 2017-05-19 LAB
CK SERPL-CCNC: 30 U/L (ref 30–300)
CK SERPL-CCNC: 37 U/L (ref 30–300)
HGB BLD-MCNC: 9.1 G/DL (ref 13.3–17.7)

## 2017-05-19 PROCEDURE — 36415 COLL VENOUS BLD VENIPUNCTURE: CPT | Performed by: INTERNAL MEDICINE

## 2017-05-19 PROCEDURE — 82550 ASSAY OF CK (CPK): CPT | Performed by: INTERNAL MEDICINE

## 2017-05-19 PROCEDURE — 01999 UNLISTED ANES PROCEDURE: CPT | Performed by: NURSE ANESTHETIST, CERTIFIED REGISTERED

## 2017-05-19 PROCEDURE — 40000193 ZZH STATISTIC PT WARD VISIT: Performed by: PHYSICAL THERAPIST

## 2017-05-19 PROCEDURE — 12000000 ZZH R&B MED SURG/OB

## 2017-05-19 PROCEDURE — 97530 THERAPEUTIC ACTIVITIES: CPT | Mod: GP | Performed by: PHYSICAL THERAPIST

## 2017-05-19 PROCEDURE — 71000014 ZZH RECOVERY PHASE 1 LEVEL 2 FIRST HR: Performed by: SURGERY

## 2017-05-19 PROCEDURE — 86038 ANTINUCLEAR ANTIBODIES: CPT | Performed by: INTERNAL MEDICINE

## 2017-05-19 PROCEDURE — 25000128 H RX IP 250 OP 636: Performed by: INTERNAL MEDICINE

## 2017-05-19 PROCEDURE — 43239 EGD BIOPSY SINGLE/MULTIPLE: CPT | Performed by: SURGERY

## 2017-05-19 PROCEDURE — 25000132 ZZH RX MED GY IP 250 OP 250 PS 637: Performed by: INTERNAL MEDICINE

## 2017-05-19 PROCEDURE — 86235 NUCLEAR ANTIGEN ANTIBODY: CPT | Performed by: INTERNAL MEDICINE

## 2017-05-19 PROCEDURE — 83516 IMMUNOASSAY NONANTIBODY: CPT | Performed by: INTERNAL MEDICINE

## 2017-05-19 PROCEDURE — 94799 UNLISTED PULMONARY SVC/PX: CPT

## 2017-05-19 PROCEDURE — 88305 TISSUE EXAM BY PATHOLOGIST: CPT | Mod: TC | Performed by: SURGERY

## 2017-05-19 PROCEDURE — 0DB78ZX EXCISION OF STOMACH, PYLORUS, VIA NATURAL OR ARTIFICIAL OPENING ENDOSCOPIC, DIAGNOSTIC: ICD-10-PCS | Performed by: SURGERY

## 2017-05-19 PROCEDURE — 27210794 ZZH OR GENERAL SUPPLY STERILE: Performed by: SURGERY

## 2017-05-19 PROCEDURE — 94375 RESPIRATORY FLOW VOLUME LOOP: CPT

## 2017-05-19 PROCEDURE — 25000125 ZZHC RX 250: Performed by: NURSE ANESTHETIST, CERTIFIED REGISTERED

## 2017-05-19 PROCEDURE — 37000008 ZZH ANESTHESIA TECHNICAL FEE, 1ST 30 MIN: Performed by: SURGERY

## 2017-05-19 PROCEDURE — 40000306 ZZH STATISTIC PRE PROC ASSESS II: Performed by: SURGERY

## 2017-05-19 PROCEDURE — 84999 UNLISTED CHEMISTRY PROCEDURE: CPT | Performed by: INTERNAL MEDICINE

## 2017-05-19 PROCEDURE — 25000128 H RX IP 250 OP 636: Performed by: NURSE ANESTHETIST, CERTIFIED REGISTERED

## 2017-05-19 PROCEDURE — 86039 ANTINUCLEAR ANTIBODIES (ANA): CPT | Performed by: INTERNAL MEDICINE

## 2017-05-19 PROCEDURE — 85018 HEMOGLOBIN: CPT | Performed by: INTERNAL MEDICINE

## 2017-05-19 PROCEDURE — 99232 SBSQ HOSP IP/OBS MODERATE 35: CPT | Performed by: INTERNAL MEDICINE

## 2017-05-19 PROCEDURE — 36000050 ZZH SURGERY LEVEL 2 1ST 30 MIN: Performed by: SURGERY

## 2017-05-19 RX ORDER — PREDNISONE 20 MG/1
60 TABLET ORAL DAILY
Status: DISCONTINUED | OUTPATIENT
Start: 2017-05-19 | End: 2017-05-19

## 2017-05-19 RX ORDER — SODIUM CHLORIDE, SODIUM LACTATE, POTASSIUM CHLORIDE, CALCIUM CHLORIDE 600; 310; 30; 20 MG/100ML; MG/100ML; MG/100ML; MG/100ML
INJECTION, SOLUTION INTRAVENOUS CONTINUOUS
Status: DISCONTINUED | OUTPATIENT
Start: 2017-05-19 | End: 2017-05-19 | Stop reason: HOSPADM

## 2017-05-19 RX ORDER — ONDANSETRON 4 MG/1
4 TABLET, ORALLY DISINTEGRATING ORAL EVERY 30 MIN PRN
Status: DISCONTINUED | OUTPATIENT
Start: 2017-05-19 | End: 2017-05-19 | Stop reason: HOSPADM

## 2017-05-19 RX ORDER — NALOXONE HYDROCHLORIDE 0.4 MG/ML
.1-.4 INJECTION, SOLUTION INTRAMUSCULAR; INTRAVENOUS; SUBCUTANEOUS
Status: DISCONTINUED | OUTPATIENT
Start: 2017-05-19 | End: 2017-05-19 | Stop reason: HOSPADM

## 2017-05-19 RX ORDER — MEPERIDINE HYDROCHLORIDE 25 MG/ML
12.5 INJECTION INTRAMUSCULAR; INTRAVENOUS; SUBCUTANEOUS
Status: DISCONTINUED | OUTPATIENT
Start: 2017-05-19 | End: 2017-05-19 | Stop reason: HOSPADM

## 2017-05-19 RX ORDER — LIDOCAINE HYDROCHLORIDE 20 MG/ML
INJECTION, SOLUTION INFILTRATION; PERINEURAL PRN
Status: DISCONTINUED | OUTPATIENT
Start: 2017-05-19 | End: 2017-05-19

## 2017-05-19 RX ORDER — PROPOFOL 10 MG/ML
INJECTION, EMULSION INTRAVENOUS PRN
Status: DISCONTINUED | OUTPATIENT
Start: 2017-05-19 | End: 2017-05-19

## 2017-05-19 RX ORDER — SODIUM CHLORIDE, SODIUM LACTATE, POTASSIUM CHLORIDE, CALCIUM CHLORIDE 600; 310; 30; 20 MG/100ML; MG/100ML; MG/100ML; MG/100ML
INJECTION, SOLUTION INTRAVENOUS CONTINUOUS PRN
Status: DISCONTINUED | OUTPATIENT
Start: 2017-05-19 | End: 2017-05-19

## 2017-05-19 RX ORDER — FENTANYL CITRATE 50 UG/ML
25-50 INJECTION, SOLUTION INTRAMUSCULAR; INTRAVENOUS
Status: DISCONTINUED | OUTPATIENT
Start: 2017-05-19 | End: 2017-05-19 | Stop reason: HOSPADM

## 2017-05-19 RX ORDER — ONDANSETRON 2 MG/ML
4 INJECTION INTRAMUSCULAR; INTRAVENOUS EVERY 30 MIN PRN
Status: DISCONTINUED | OUTPATIENT
Start: 2017-05-19 | End: 2017-05-19 | Stop reason: HOSPADM

## 2017-05-19 RX ADMIN — LIDOCAINE HYDROCHLORIDE 40 MG: 20 INJECTION, SOLUTION INFILTRATION; PERINEURAL at 10:43

## 2017-05-19 RX ADMIN — SODIUM CHLORIDE, POTASSIUM CHLORIDE, SODIUM LACTATE AND CALCIUM CHLORIDE: 600; 310; 30; 20 INJECTION, SOLUTION INTRAVENOUS at 10:39

## 2017-05-19 RX ADMIN — PROPOFOL 40 MG: 10 INJECTION, EMULSION INTRAVENOUS at 10:50

## 2017-05-19 RX ADMIN — HYDROCODONE BITARTRATE AND ACETAMINOPHEN 1 TABLET: 5; 325 TABLET ORAL at 00:48

## 2017-05-19 RX ADMIN — PROPOFOL 80 MG: 10 INJECTION, EMULSION INTRAVENOUS at 10:43

## 2017-05-19 RX ADMIN — HYDROCODONE BITARTRATE AND ACETAMINOPHEN 1 TABLET: 5; 325 TABLET ORAL at 12:15

## 2017-05-19 RX ADMIN — FLUTICASONE FUROATE AND VILANTEROL TRIFENATATE 1 PUFF: 100; 25 POWDER RESPIRATORY (INHALATION) at 12:12

## 2017-05-19 RX ADMIN — PROPOFOL 40 MG: 10 INJECTION, EMULSION INTRAVENOUS at 10:45

## 2017-05-19 RX ADMIN — HYDROCODONE BITARTRATE AND ACETAMINOPHEN 1 TABLET: 5; 325 TABLET ORAL at 19:35

## 2017-05-19 RX ADMIN — HYDROCODONE BITARTRATE AND ACETAMINOPHEN 1 TABLET: 5; 325 TABLET ORAL at 14:56

## 2017-05-19 RX ADMIN — SODIUM CHLORIDE, PRESERVATIVE FREE 1000 ML: 5 INJECTION INTRAVENOUS at 00:48

## 2017-05-19 RX ADMIN — MIDAZOLAM HYDROCHLORIDE 2 MG: 1 INJECTION, SOLUTION INTRAMUSCULAR; INTRAVENOUS at 10:43

## 2017-05-19 ASSESSMENT — PAIN DESCRIPTION - DESCRIPTORS
DESCRIPTORS: ACHING;SORE;BURNING
DESCRIPTORS: ACHING

## 2017-05-19 ASSESSMENT — LIFESTYLE VARIABLES: TOBACCO_USE: 1

## 2017-05-19 ASSESSMENT — COPD QUESTIONNAIRES
COPD: 1
CAT_SEVERITY: MODERATE

## 2017-05-19 NOTE — ANESTHESIA PREPROCEDURE EVALUATION
Anesthesia Evaluation     . Pt has had prior anesthetic. Type: General and MAC    No history of anesthetic complications          ROS/MED HX    ENT/Pulmonary:     (+)tobacco use, Current use 1 packs/day  moderate COPD, , . .    Neurologic:     (+)other neuro memory problems    Cardiovascular:     (+) Dyslipidemia, hypertension--CAD, --. : . . . :. valvular problems/murmurs type: AS .       METS/Exercise Tolerance:     Hematologic:     (+) Anemia, -      Musculoskeletal:         GI/Hepatic:     (+) GERD liver disease, Other GI/Hepatic ETOH abuse/dependancy      Renal/Genitourinary:         Endo:         Psychiatric:         Infectious Disease:         Malignancy:         Other:                     Physical Exam  Normal systems: pulmonary    Airway   Mallampati: III  TM distance: >3 FB  Neck ROM: limited    Dental   (+) missing, loose, chipped and other    Cardiovascular   Rhythm and rate: regular  (+) murmur       Pulmonary    breath sounds clear to auscultation                    Anesthesia Plan      History & Physical Review  History and physical reviewed and following examination; no interval change.    ASA Status:  3 .    NPO Status:  > 8 hours    Plan for MAC with Intravenous induction. Maintenance will be TIVA.  Reason for MAC:  Chronic cardiopulmonary disease (G9), Difficulty with conscious sedation (QS) and Deep or markedly invasive procedure (G8)  PONV prophylaxis:  Ondansetron (or other 5HT-3)       Postoperative Care      Consents  Anesthetic plan, risks, benefits and alternatives discussed with:  Patient.  Use of blood products discussed: Yes.   .                          .

## 2017-05-19 NOTE — PROGRESS NOTES
Clinician attempted to evaluate patient's cognition but patient refused stating that he was upset.

## 2017-05-19 NOTE — PROGRESS NOTES
Unfortunately our pathology lab needs a weeks notice for muscle biopsies to arrange the needed supplies to prepare the specimen.  I have spoken to Dr. Mejias about possible EGD.

## 2017-05-19 NOTE — PLAN OF CARE
Problem: Goal Outcome Summary  Goal: Goal Outcome Summary  Outcome: Improving  Pt presented with VSS, rating pain max in BLE at 5/10 with adequate pain control achieved. Pt down to surgery for EGD today, returned with VSS. Up with PT and staff today with assist X1-2 with walker and gait belt. Pt tolerating regular diet without complication. Bowel sounds are active. IV infusing TKO without complication. Pt is alert/oriented, calls appropriately. Discussed discharge plan with pt, pt very tearful and overwhelmed at the fact that he will be going to an assisted living or nursing home. Pt understands that he is unable to care for himself at home and that he would not be safe. Pt was given time to process information and has accepted that he will not be going home. Will continue to monitor.     Temp: 98.1  F (36.7  C) Temp src: Oral BP: (!) 134/46   Heart Rate: 68 Resp: 16 SpO2: 100 % O2 Device: None (Room air)

## 2017-05-19 NOTE — ANESTHESIA POSTPROCEDURE EVALUATION
Patient: Abelardo Escobar    Procedure(s):  Upper Endoscopy with biopsies - Wound Class: II-Clean Contaminated    Diagnosis:Myositis, abdominal pain  Diagnosis Additional Information: No value filed.    Anesthesia Type:  MAC    Note:  Anesthesia Post Evaluation    Patient location during evaluation: Phase 2  Patient participation: Able to fully participate in evaluation  Level of consciousness: awake and alert  Pain management: adequate  Airway patency: patent  Cardiovascular status: acceptable  Respiratory status: acceptable  Hydration status: acceptable  PONV: none             Last vitals:  Vitals:    05/19/17 0938 05/19/17 1105 05/19/17 1110   BP: 123/72 (!) 93/45 (!) 100/47   Pulse:      Resp: 16     Temp: 98.9  F (37.2  C)     SpO2: 100% 100% 100%         Electronically Signed By: CRYSTAL Victoria CRNA  May 19, 2017  11:19 AM

## 2017-05-19 NOTE — PLAN OF CARE
"Problem: Goal Outcome Summary  Goal: Goal Outcome Summary  Outcome: No Change  Patient slept well throughout the shift. Rates pain 4/10 states this is tolerable for him. Declined the need for medication intervention initially, then requested a norco within 5 minutes. Patient continues to have soft pressures throughout the shift. Received 1L bolus per MD order. Blood pressure remains 90's/40's. Patient continues to describe a generalized uneasy feeling states he is nervous for his procedure in the AM. Patient reassured and re educated. States he feels much better about the procedure. BP (!) 92/40 (BP Location: Right arm)  Pulse 71  Temp 99.7  F (37.6  C) (Tympanic)  Resp 16  Ht 1.676 m (5' 6\")  Wt 67.8 kg (149 lb 7.6 oz)  SpO2 98%  BMI 24.13 kg/m2. No stools noted on shift. Rash/bruising remains unchanged throughout the shift. IV saline locked following bolus per order. NPO status remains after mid night.         Problem: Gastrointestinal Bleeding (Adult)  Goal: Signs and Symptoms of Listed Potential Problems Will be Absent or Manageable (Gastrointestinal Bleeding)  Signs and symptoms of listed potential problems will be absent or manageable by discharge/transition of care (reference Gastrointestinal Bleeding (Adult) CPG).   Outcome: No Change    05/18/17 1651   Gastrointestinal Bleeding   Problems Assessed (GI Bleeding) all   Problems Present (GI Bleeding) none         Face to face report given with opportunity to observe patient.    Report given to CODY Macdonald   5/19/2017  7:30 AM      "

## 2017-05-19 NOTE — PROVIDER NOTIFICATION
MD contacted in regards to patient blood pressure of 88/40. Orders to infuse 1L 0.9%  NS over 5 hours.

## 2017-05-19 NOTE — ANESTHESIA CARE TRANSFER NOTE
Patient: Abelardo Escobar    Procedure(s):  Upper Endoscopy with biopsies - Wound Class: II-Clean Contaminated    Diagnosis: Myositis, abdominal pain  Diagnosis Additional Information: No value filed.    Anesthesia Type:   MAC     Note:  Airway :Nasal Cannula  Patient transferred to:Phase II        Vitals: (Last set prior to Anesthesia Care Transfer)    CRNA VITALS  5/19/2017 1028 - 5/19/2017 1110      5/19/2017             Pulse: 68    Ht Rate: 68    SpO2: 100 %    Resp Rate (set): 8                Electronically Signed By: CRYSTAL Victoria CRNA  May 19, 2017  11:10 AM

## 2017-05-19 NOTE — PROGRESS NOTES
"[facility]    Hospitalist Progress Note    Date of Service (when I saw the patient): 05/19/2017    Assessment & Plan   Abelardo Escobar is a 56 year old  man who was admitted on 5/17/2017. After, in particular, family noted increasing pallor and weakness over the past several days. He is also noted intermittent dark stools.  Although he had not noted any change in the odor of stools .  A marked melenic odor is noted.  On examining the patient.  Interval history is significant in that the patient reports 4 the last 2 months that he has noticed the insidious onset of shortness of breath and weakness, more noticeable with activity. The patient also noticed in the last several weeks intermittent dark stools. He notes that beginning approximately one month ago he noted a discrete bullous lesion centrally just above the his perineum.  He was able to express dark material from this.  Rapidly after that, beginning within 1-2 days and progressively since then he has had violaceous discoloration of his thighs bilaterally associated with pain on movement and firmness. Late in April.  He was evaluated first at [facility] and subsequently transferred to [facility] in [place].  Based on review of records evaluation centered around his noting sharp left lateral chest wall pain .  Evaluation for acute coronary syndrome was unrevealing.  He underwent nuclear stress test which demonstrated no active ischemia.  Lower extremity duplex showed no evidence of thromboembolic disease.  Skin discoloration was noted, which was then felt to represent an ecchymosis and possibly occult trauma.  Since that time, he was admitted there approximately 4 days and discharged.  He subsequently presented to this emergency department several days later with persistent dyspnea.  Evaluation included  Chest CT in \"angiogram\" protocol which demonstrated no evidence of thromboembolic disease. Radiographic emphysema was noted.  He was " "prescribed a bronchodilator which he has used with mild response, but he has remained quite dyspneic.  He is, however, most bothered by the pain, firmness, and discoloration of his thighs bilaterally.   [facility] to [facility].  He was transferred to emergency department here and admitted for further evaluation and management.     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 Evaluation included magnetic resonance imaging which shows images, after consultation with the radiologist, felt to be quite consistent with dermatomyositis with inflammation in both thigh muscle groups, as well as in some of the muscle groups of the pelvis. The   location of his skin findings on the medial thighs is not \"classic\" with bilateral lateral thigh location more typical.  Certainly, however, dermatomyositis may present here.  His other findings including pain and weakness are quite consistent with this.  He was evaluated, including CK levels, while at [facility] in [place], with the level not being elevated.  He also reports dyspnea.  While this may be consistent with chronic obstructive airways disease or deconditioning.  This also may reflect neuromuscular weakness in respiratory muscles quite typical with neuromuscular pathology. Bedside PFTs were done with mild obstructive airway disease noted, but MIP and MEP reduced at  -40 cm H20 and 50 cm H20 respectively.   Doubt interstitial lung involvement, although this is possible.  Chest x-ray and chest CT angio, however, is unrevealing.   Discussed with rheumatology by phone at [facility].  They have recommended that in addition to serologies already requested that muscle biopsy be obtained and that subsequently it would be quite reasonable to begin treating the patient.  Usual initial regimens are of " prednisone 1 mg/kg for 4-6 weeks, then with a slow titration downward.  Patients do not always tolerate this level and duration of treatment, however.  Longer treatment may be associated with steroid myopathy.  In general, this is avoided.  Initially, was felt that best approach may be to initiate treatment and arrange rheumatology follow-up in the near future, at which time evaluation of serologies would be possible.  These results are unlikely to be available in less than several weeks after submitting specimens. Unable to obtain muscle biopsy until 5/23/17. Was going to initiate steroid therapy, but increasing concern when considering the normal CK level which just is not consistent with polymyositis/dermatomyositis. Given patient relatively stable at present, have elected to hold off on steroid therapy which first awaiting results of biopsy. Concern is that if the biopsy is non-diagnostic, the longer he is on steroids, perhaps it may interfere with additional testing and workup. Might also consider myositis associated as paraneoplastic syndrome. Also have sent off HMG CR antibody and JAYLA as well. Will also check ESR.    2.  Gastrointestinal hemorrhage.  The patient carries a history of peptic ulcer disease.  He also notes epigastric discomfort generally happening after eating and relieved with fairly regular use of milk.  Dyspepsia was noted on the time of his evaluation at [facility] and omeprazole was begun then.  Given gastrointestinal hemorrhage.  He was appropriately treated with IV pantoprazole, now changed to an oral route.  Had EGD this morning with patchy lesions noted in the body of the stomach and 2 isolated patches in the antrum, biopsies taken of both regions. No active bleeding discovered.  Certainly his presentation is quite consistent with gastrointestinal hemorrhage.  This may be a lower source, but colon preparation would be more difficult and perhaps if required, this could be arranged as an  outpatient.  Is muscle biopsy is inconclusive, this might be considered further with possibility of myopathy being a presentation of paraneoplastic sydrome. He did receive transfusion of 2 units of packed red blood cells with a favorable response in his hemoglobin level.      3.  Chronic obstructive pulmonary disease  Prolonged history of tobacco abuse and certainly on clinical grounds, he appears to at least have a moderate degree of airflow obstruction.  This is supported by bedside PFT results.  For the time being the best approach may be to continue short acting bronchodilators on an as-needed basis.  He has multiple other more urgent issues.  However, pulmonary function testing at some point in the future would be quite reasonable and consideration of a long-acting beta agonist or long-acting antimuscarinic.    4.  Aortic stenosis.  Mild aortic stenosis noted on recent echocardiogram.  Continuing metoprolol.  This also may be beneficial in terms of treatment of primary hypertension,, although admittedly not the agent choice for hypertension alone.    5.  Generalized weakness.  As above, significant degree of his  weakness connected with some type of myopathy or neuromuscular disorder.  He has shown an improved exercise tolerance, initially felt possibly on the basis of transfusion alone.   PT evaluation is ongoing.  He is likely going to need SNF for discharge planning given his profound weakness and limited exercise capacity    6.  Cognitive dysfunction.  The patient relates increasing memory problems.  He is quite frustrated with multiple hospitalizations and medical system involvement with an impression that he is not been able to understand events and explanations.  Short-term recall is intact..  However responses somewhat slowed.  This may reflect acute illness alone.  However, cognitive evaluation by speech therapy may be beneficial in terms of establishing a objective baseline. They tried today, but  patient declined, due to emotional upset regarding his current state.    DVT Prophylaxis: initially encouraging active ambulation.  Anticoagulation contraindicated by gastrointestinal hemorrhage and planned procedures. Intermittent pneumatic compression relatively contraindicated by intense inflammatory process in both legs.  Code Status: Full Code    Disposition: Expected discharge in 3-4 days once further diagnostic procedures are possible and a safe discharge planning can be arranged.    Fredo Thacker    Interval History   Awake alert and interactive. Complains of depression concerning his current physical state    -Data reviewed today: I reviewed all new labs and imaging results over the last 24 hours.NONE    Peripheral IV 05/17/17 Left (Active)   Site Assessment WDL 5/19/2017  3:00 PM   Line Status Infusing 5/19/2017  3:00 PM   Phlebitis Scale 0-->no symptoms 5/19/2017  3:00 PM   Infiltration Scale 0 5/19/2017  3:00 PM   Extravasation? No 5/19/2017 11:40 AM   Number of days:2     Line/device assessment(s) completed for medical necessity     Physical Exam   Temp: 98.1  F (36.7  C) Temp src: Oral BP: (!) 134/46   Heart Rate: 68 Resp: 16 SpO2: 100 % O2 Device: None (Room air) Oxygen Delivery: 2 LPM  Vitals:    05/17/17 1930   Weight: 67.8 kg (149 lb 7.6 oz)     Vital Signs with Ranges  Temp:  [97.2  F (36.2  C)-99.7  F (37.6  C)] 98.1  F (36.7  C)  Heart Rate:  [58-79] 68  Resp:  [16] 16  BP: ()/(38-92) 134/46  SpO2:  [98 %-100 %] 100 %  I/O last 3 completed shifts:  In: 720 [P.O.:720]  Out: 900 [Urine:900]    Awake, alert, man lying in bed on med/surg.  Speech is clear.  Responses mildly slowed.  Short-term recall intact.  Affect appropriate.  HEENT: Pupils equal, conjugate. No icterus or nystagmus. Oral mucosa moist. No facial asymmetry.   Neck: Supple, jugular veins not elevated. Trachea midline   Chest: CTA bilaterally, no ronchi/wheezes.   Cardiac:  S1, S2 unremarkable. No S3, S4. . 1/6 crescendo  decrescendo murmur at left sternal border.  No significant radiation to carotids.   Abdomen: Soft. No palpation or percussion tenderness. No distention. Normoactive bowel sounds. Liver and spleen not increased in size. No bruits, masses, or pulsations.   Extremities: No lower extremity edema. Violaceous appearance diffusely involving left medial and right lateral and medial thighs and extending distally just below the knees              Neurologic: Mental state above. Motor 5/5 and bilaterally equal. Tone preserved. No fasiculations or tremors. Sensation intact to light touch.     Medications        pantoprazole  40 mg Oral QAM     fluticasone-vilanterol  1 puff Inhalation Daily     sodium chloride (PF)  3 mL Intracatheter Q8H     thiamine tablet 100 mg  100 mg Oral Daily     Data     Recent Labs  Lab 05/19/17  1520 05/18/17  1237 05/18/17  0505 05/17/17  1444   WBC  --   --  5.7 7.9   HGB 9.1* 9.2* 8.5* 6.6*   MCV  --   --  91 96   PLT  --   --  200 249   INR  --   --  1.06 1.17   NA  --   --  138 138   POTASSIUM  --   --  3.9 4.1   CHLORIDE  --   --  106 105   CO2  --   --  26 22   BUN  --   --  20 24   CR  --   --  1.02 1.09   ANIONGAP  --   --  6 11   KENRICK  --   --  7.8* 8.4*   GLC  --   --  87 121*   ALBUMIN  --   --  2.2* 2.3*   PROTTOTAL  --   --  5.5* 6.1*   BILITOTAL  --   --  2.0* 1.4*   ALKPHOS  --   --  70 71   ALT  --   --  10 11   AST  --   --  15 11       Lactic Acid   Date Value Ref Range Status   05/18/2017 1.3 0.4 - 2.0 mmol/L Final   05/17/2017 2.3 (H) 0.4 - 2.0 mmol/L Final     Comment:     SIGNIFICANT RESULT REPORTED TO NICOLETTE CROWE @ 2051 ON 05/17/17 BY [facility]       No results found for this or any previous visit (from the past 24 hour(s)).

## 2017-05-19 NOTE — PROGRESS NOTES
"[facility]    Hospitalist Progress Note    Date of Service (when I saw the patient): 05/18/2017    Assessment & Plan   Abelardo Escobar is a 56 year old  man who was admitted on 5/17/2017. After, in particular, family noted increasing pallor and weakness over the past several days. He is also noted intermittent dark stools.  Although he had not noted any change in the odor of stools .  A marked melenic odor is noted.  On examining the patient.  Interval history is significant iin that the patient reports 4 the last 2 months that he has noticed the insidious onset of shortness of breath and weakness, more noticeable with activity. The patient also noticed in the last several weeks intermittent dark stools. He notes that beginning approximately one month ago he noted a discrete bullous lesion centrally just above the his perineum.  He was able to express dark material from this.  Rapidly after that, beginning within 1-2 days and progressively since then he has had violaceous discoloration of his thighs bilaterally associated with pain on movement and firmness. Late in April.  He was evaluated first at [facility] and subsequently transferred to [facility] in [place].  Based on review of records evaluation centered around his noting sharp left lateral chest wall pain .  Evaluation for acute coronary syndrome was unrevealing.  He underwent nuclear stress test which demonstrated no active ischemia.  Lower extremity duplex showed no evidence of thromboembolic disease.  Skin discoloration was noted then felt to represent an ecchymosis and possibly occult trauma.  Since that time.  He was admitted there approximately 4 days and discharged.  He subsequently we presented to this emergency department several days later with persistent dyspnea.  Evaluation included  Chest CT in \"angiogram\" protocol which demonstrated no evidence of thromboembolic disease. Radiographic emphysema was noted.  He was prescribed " "a bronchodilator which she has used with mild response, but he has remained quite dyspneic.  He is, however, most fathered by the pain firmness and discoloration of his thighs bilaterally.   [facility] was noted to be ac[facility]emic.  He was transferred to emergency department here and admitted for further evaluation and management.     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  Location medial on the thighs is not \"classic\" with bilateral lateral thigh location be more typical.  Certainly, however, dermatomyositis may present here.  His other findings including pain and weakness are quite consistent with this.  He was evaluated including CK levels while at [facility] in [place] with level not being elevated.  It is quite possible that the elevated troponin.  Then, may have been a reflection of polymyositis.  He also reports dyspnea.  While this may be consistent with chronic obstructive airways disease or deconditioning.  This also may reflect neuromuscular weakness in respiratory muscles quite typical with dermatomyositis.   Doubt interstitial lung involvement, although this is possible.  Chest x-ray, however, is unrevealing.   Discussed with rheumatology by phone at [facility].  They have recommended that in addition to serologies already requested that muscle biopsy be obtained and that subsequently it would be quite reasonable to begin treating the patient.  Usual initial regimens are of prednisone 1 mg/kg for 4-6 weeks, then with a slow titration downward.  Patients do not always tolerate this level and  Duration of treatment, however.  Longer treatment may be associated with steroid myopathy.  In general, he is avoided.  Best approach may be to " initiate treatment and arrange rheumatology follow-up in the near future which time evaluation of serologies would be possible.  These results are unlikely to be available in less than several weeks after  Submitting specimens.  2.  Gastrointestinal hemorrhage.  The patient carries a history of peptic ulcer disease.  He also notes epigastric discomfort generally happening after eating and relieved with fairly regular use of milk.  Dyspepsia was noted on the time of his evaluation at [facility] and omeprazole was begun then.  Given gastrointestinal hemorrhage.  She was appropriately treated with pantoprazole, today changed to an oral route.  Have discussed with Dr. Schaefer of general surgery who plans esophagogastroduodenoscopy tomorrow morning.  Certainly his presentation is quite consistent with gastrointestinal hemorrhage.  This may be a lower source, but colon preparation would be more difficult and perhaps if required.  This could be arranged as an outpatient.  He is undergoing transfusion of 2 units of packed red blood cells with a favorable response in his hemoglobin level.  3.  Chronic obstructive pulmonary disease  Prolonged history of tobacco abuse and certainly on clinical grounds.  He appears to at least have a moderate degree of airflow obstruction.   For the time being the best approach may be to continue short acting bronchodilators on an as-needed basis.  He has multiple other more urgent issues.  However, pulmonary function testing at some point in the future would be quite reasonable and consideration of a long-acting beta agonist or long-acting antimuscarinic.  4.  Aortic stenosis.  Mild aortic stenosis noted on recent echocardiogram.  Continuing metoprolol.  This also may be beneficial in terms of treatment of primary hypertension,, although admittedly not the agent choice for hypertension alone.  5.  Generalized weakness.  As above, significant degree of his  Weakness may be under  Basis of  dermatomyositis.  He shown an improved exercise tolerance.  Today, possibly on the basis of transfusion alone.   PT evaluation is ongoing.  Depending on his exercise tolerance.  It possible that subacute rehabilitation will be needed for a  Safe discharge plan.  6.  Cognitive dysfunction.  The patient relates increasing memory problems.  He is quite frustrated with multiple hospitalizations and medical system involvement with an impression that he is not been able to understand events and explanations.  Short-term recall is intact..  However responses somewhat slowed.  This may reflect acute illness alone.  However, cognitive evaluation by speech therapy may be beneficial in terms of establishing a objective baseline.    Active Problems:    Blood loss anemia    DVT Prophylaxis: initially encouraging  Active ambulation.  Anticoagulation contraindicated by gastrointestinal hemorrhage and planned procedures. Intermittent pneumatic compression relatively contraindicated by  Intense inflammatory process in both legs.  Code Status: Full Code    Disposition: Expected discharge in 1-2 days once further diagnostic procedures are possible and a safe discharge planning can be arranged.    Tim Kam    Interval History   Awake alert and interactive.  Somewhat frustrated by his prolonged symptoms    -Data reviewed today: I reviewed all new labs and imaging results over the last 24 hours. I personally reviewed magnetic resonance imaging of thighs bilaterally.  Reviewed  Extensively with radiologist, with findings as above. CT of the chest is reviewed from April 2017.  Mild radiographic emphysema.  No thromboembolic disease.. Chest radiograph obtained in emergency department is reviewed.  No focal infiltrate.  Right upper lobe nodule unchanged compared to computed tomography scan.    Peripheral IV 05/17/17 Right (Active)   Site Assessment WDL 5/18/2017  4:00 PM   Line Status Saline locked 5/18/2017  4:00 PM   Phlebitis  Scale 0-->no symptoms 5/18/2017  4:00 PM   Infiltration Scale 0 5/18/2017  4:00 PM   Number of days:1       Peripheral IV 05/17/17 Left (Active)   Site Assessment WDL 5/18/2017  4:00 PM   Line Status Saline locked 5/18/2017  4:00 PM   Phlebitis Scale 0-->no symptoms 5/18/2017  4:00 PM   Infiltration Scale 0 5/18/2017  4:00 PM   Number of days:1     Line/device assessment(s) completed for medical necessity 18 May    Physical Exam   Temp: 98.7  F (37.1  C) Temp src: Tympanic BP: 104/55 Pulse: 71 Heart Rate: 68 Resp: 16 SpO2: 98 % O2 Device: None (Room air)    Vitals:    05/17/17 1930   Weight: 67.8 kg (149 lb 7.6 oz)     Vital Signs with Ranges  Temp:  [97.8  F (36.6  C)-98.8  F (37.1  C)] 98.7  F (37.1  C)  Pulse:  [71-79] 71  Heart Rate:  [58-94] 68  Resp:  [8-36] 16  BP: ()/(46-88) 104/55  SpO2:  [92 %-100 %] 98 %  I/O last 3 completed shifts:  In: 6 [I.V.:6]  Out: 375 [Urine:375]    Awake, alert, man lying in bed on ICU.  Speech is clear.  Responses mildly slowed.  Short-term recall intact.  Wrist 3/3 objects at 5 minutes.  Affect appropriate.  HEENT: Pupils equal, conjugate. No icterus or nystagmus. Oral mucosa moist. No facial asymmetry.   Neck: Supple, jugular veins not elevated. Trachea midline   Chest: No chest wall movement asymmetry. Aeration mildly diminished globally. Accessory muscles not in use. Expiratory time not significantlyincreased. No tidal wheezes. Few mid and lower field medium rhonchi. No discrete crackles.   Cardiac: PMI not displaced. S1, S2 unremarkable. No S3, S4. P2 not accentuated. 1/6 crescendo decrescendo murmur at left sternal border.  No significant radiation to carotids. Carotid upstroke preserved. Carotid amplitude preserved.   Abdomen: Soft. No palpation or percussion tenderness. No distention. Normoactive bowel sounds. Liver and spleen not increased in size. No bruits, masses, or pulsations.   Extremities: No lower extremity edema. No eccymoses, subungual bogginess.    Neurologic: Mental state above. Motor 5/5 and bilaterally equal. Tone preserved. No fasiculations or tremors. Sensation intact to light touch. DTR 2/4 and bilaterally equal.     Medications        [START ON 5/19/2017] pantoprazole  40 mg Oral QAM     fluticasone-vilanterol  1 puff Inhalation Daily     metoprolol (LOPRESSOR) tablet 50 mg  50 mg Oral Daily     sodium chloride (PF)  3 mL Intracatheter Q8H     thiamine tablet 100 mg  100 mg Oral Daily           Data     Recent Labs  Lab 05/18/17  1237 05/18/17  0505 05/17/17  1444   WBC  --  5.7 7.9   HGB 9.2* 8.5* 6.6*   MCV  --  91 96   PLT  --  200 249   INR  --  1.06 1.17   NA  --  138 138   POTASSIUM  --  3.9 4.1   CHLORIDE  --  106 105   CO2  --  26 22   BUN  --  20 24   CR  --  1.02 1.09   ANIONGAP  --  6 11   KENRICK  --  7.8* 8.4*   GLC  --  87 121*   ALBUMIN  --  2.2* 2.3*   PROTTOTAL  --  5.5* 6.1*   BILITOTAL  --  2.0* 1.4*   ALKPHOS  --  70 71   ALT  --  10 11   AST  --  15 11       Lactic Acid   Date Value Ref Range Status   05/18/2017 1.3 0.4 - 2.0 mmol/L Final   05/17/2017 2.3 (H) 0.4 - 2.0 mmol/L Final     Comment:     SIGNIFICANT RESULT REPORTED TO NICOLETTE CROWE @ 2051 ON 05/17/17 BY [facility]       Recent Results (from the past 24 hour(s))   MR Pelvis w/o & w Contrast    Narrative    PELVIS MRI    TECHNIQUE:  Images were obtained axially and coronally T2 HASTE,  axially STIR and HASTE technique, axially T1-weighted with fat  saturation with and without gadolinium.    FINDINGS:  There is abnormal edema with enhancement seen symmetrically  involving the adductor muscle groups of both thighs.  Hamstring  muscles were also involved to a lesser extent.  The quadriceps muscle  groups appear largely spared bilaterally.  In the pelvic girdle, there  is some involvement of the obturator externus muscle groups.  There is  some edema and enhancement of the subcutaneous tissues of both thighs,  both medially and laterally.  There is a small amount of enhancement  and  edema in the gluteus tahmina muscle caudally on the right.  No  soft tissue masses are seen.  There are some ring-like areas of  altered signal seen in some of the muscle groups.  No bone marrow  abnormalities are noted.  In the pelvis, the bladder and rectum appear  normal.    IMPRESSION:  PATTERN OF INFLAMMATION IN BOTH THIGH MUSCLE GROUPS, AS  WELL AS IN SOME OF THE MUSCLE GROUPS OF THE PELVIS IS MOST CONSISTENT  WITH DERMATOMYOSITIS.  Exam Date: May 18, 2017 12:17:58 AM  Author: ADELAIDA SCHAFFER  This report is final and signed

## 2017-05-19 NOTE — BRIEF OP NOTE
[facility]   Brief Operative Note    Pre-operative diagnosis: Myositis, abdominal pain   Post-operative diagnosis gastritis  vs vascular ectasia   Procedure: Procedure(s):  Upper Endoscopy with biopsies - Wound Class: II-Clean Contaminated   Surgeon(s): Surgeon(s) and Role:     * July Mejias DO - Primary   Estimated blood loss: Minimal     Specimens:   ID Type Source Tests Collected by Time Destination   A : antrum lesion Biopsy Stomach, Antrum SURGICAL PATHOLOGY EXAM July Mejias,  5/19/2017 10:54 AM    B :  stomach body lesion  Tissue Stomach, Body SURGICAL PATHOLOGY EXAM July Mejias,  5/19/2017 10:55 AM       Findings: Patchy lesion in the body of the stomach and 2 discrete lesion in the antrum, no ulceration, no active bleeding

## 2017-05-19 NOTE — PLAN OF CARE
Face to face report given with opportunity to observe patient.    Report given to CODY To.     Renae Hahn   5/18/2017  7:09 PM

## 2017-05-19 NOTE — PLAN OF CARE
Problem: Goal Outcome Summary  Goal: Goal Outcome Summary  Outcome: Improving  Instructed that he will be NPO after MN.  Face to face report given with opportunity to observe patient.     Report given to Nida RANDALL   5/18/2017  11:47 PM

## 2017-05-19 NOTE — PROGRESS NOTES
Bedside PFT done with FVL and MEP/MIP.  MIP [id number], MEP [id number].  Patient had a good effort.

## 2017-05-19 NOTE — OP NOTE
PreOp: Anemia and melanotic stool  PostOp: Gastritis vs vascular ectasia   Procedure: EGD with biopsy  Surgeon: July Mejias DO  Anesthesia: MAC  Complications: None  Specimen: Antral lesion and stomach body lesion biopsies      Operative Procedure:    The patient was taken to the Operating Room where he was properly identified, consent was confirmed and on the chart, a time out was performed.  The patient was then placed in the left lateral recumbent position.  Once properly sedated a bite guard was placed in his mouth.  The esophagus was intubated and into the stomach without complication.  The pyloris was also intubated without complication and first and second portion of duodenum appeared normal.  Patchy lesion were noted in the body of the stomach with 2 isolated patches seen in the antrum.  Biopsies were taken using cold forceps.  We then retroflexed within the stomach, cardia normal, small hiatal hernia noted.  The EG junction and Z line appeared normal.     The scope was then withdrawn evacuating excess gas.  The patient tolerated the procedure well, is currently resting in APU in stable condition, [facility].

## 2017-05-19 NOTE — PROVIDER NOTIFICATION
MD contacted in regards to bolus completion and no change in pressures. 92/40. MD aware. Describes a generalized uneasy feeling. Declines any marked dizziness or lightheadedness. Awaiting new orders.

## 2017-05-19 NOTE — PLAN OF CARE
Problem: Goal Outcome Summary  Goal: Goal Outcome Summary  Outcome: Change based on patient need/priority Date Met:  05/19/17  GAIT/AMBULATION: 40' with FWW min A with increased R LE calf pain with difficulty working on heel strike, notified nursing and MD re: increased calf pain and MD to do US to r/o DVT  EQUIPMENT NEEDS AT DISCHARGE: TBD  D/C RECOMMENDATIONS: short term rehab at [facility]  COMMENTS: Pt with increased R calf pain with ambulation, plan to do US to r/o DVT so will hold therapy until results back.

## 2017-05-19 NOTE — PLAN OF CARE
Participated in Care Rounds and met with Dean. Let him know that I sent out referrals to both [facility] and [facility] for short term placement. Dean does not agree that he will need a SNF, advised we would play it by ear and see what things look like on Monday and Tuesday after the biopsy. Will remain available for discharge planning.

## 2017-05-19 NOTE — PLAN OF CARE
Pt back up from procedure at 1140. Denies pain. Afebrile. VSS. IVF infusing at TKO. Voided 300cc. A&0 x4

## 2017-05-20 ENCOUNTER — APPOINTMENT (OUTPATIENT)
Dept: PHYSICAL THERAPY | Facility: HOSPITAL | Age: 57
DRG: 829 | End: 2017-05-20
Payer: COMMERCIAL

## 2017-05-20 LAB
ANION GAP SERPL CALCULATED.3IONS-SCNC: 8 MMOL/L (ref 3–14)
BUN SERPL-MCNC: 9 MG/DL (ref 7–30)
CALCIUM SERPL-MCNC: 8.1 MG/DL (ref 8.5–10.1)
CHLORIDE SERPL-SCNC: 107 MMOL/L (ref 94–109)
CO2 SERPL-SCNC: 24 MMOL/L (ref 20–32)
CREAT SERPL-MCNC: 0.84 MG/DL (ref 0.66–1.25)
ERYTHROCYTE [DISTWIDTH] IN BLOOD BY AUTOMATED COUNT: 15.5 % (ref 10–15)
ERYTHROCYTE [SEDIMENTATION RATE] IN BLOOD BY WESTERGREN METHOD: 47 MM/H (ref 0–20)
GFR SERPL CREATININE-BSD FRML MDRD: ABNORMAL ML/MIN/1.7M2
GLUCOSE SERPL-MCNC: 96 MG/DL (ref 70–99)
HCT VFR BLD AUTO: 27.6 % (ref 40–53)
HGB BLD-MCNC: 9 G/DL (ref 13.3–17.7)
MCH RBC QN AUTO: 30.1 PG (ref 26.5–33)
MCHC RBC AUTO-ENTMCNC: 32.6 G/DL (ref 31.5–36.5)
MCV RBC AUTO: 92 FL (ref 78–100)
PLATELET # BLD AUTO: 221 10E9/L (ref 150–450)
POTASSIUM SERPL-SCNC: 3.9 MMOL/L (ref 3.4–5.3)
RBC # BLD AUTO: 2.99 10E12/L (ref 4.4–5.9)
SODIUM SERPL-SCNC: 139 MMOL/L (ref 133–144)
WBC # BLD AUTO: 3.5 10E9/L (ref 4–11)

## 2017-05-20 PROCEDURE — 40000193 ZZH STATISTIC PT WARD VISIT: Performed by: PHYSICAL THERAPIST

## 2017-05-20 PROCEDURE — 99232 SBSQ HOSP IP/OBS MODERATE 35: CPT | Performed by: INTERNAL MEDICINE

## 2017-05-20 PROCEDURE — 85652 RBC SED RATE AUTOMATED: CPT | Performed by: INTERNAL MEDICINE

## 2017-05-20 PROCEDURE — 25000132 ZZH RX MED GY IP 250 OP 250 PS 637: Performed by: INTERNAL MEDICINE

## 2017-05-20 PROCEDURE — 12000000 ZZH R&B MED SURG/OB

## 2017-05-20 PROCEDURE — 85027 COMPLETE CBC AUTOMATED: CPT | Performed by: INTERNAL MEDICINE

## 2017-05-20 PROCEDURE — 80048 BASIC METABOLIC PNL TOTAL CA: CPT | Performed by: INTERNAL MEDICINE

## 2017-05-20 PROCEDURE — 36415 COLL VENOUS BLD VENIPUNCTURE: CPT | Performed by: INTERNAL MEDICINE

## 2017-05-20 PROCEDURE — 97530 THERAPEUTIC ACTIVITIES: CPT | Mod: GP | Performed by: PHYSICAL THERAPIST

## 2017-05-20 RX ADMIN — HYDROCODONE BITARTRATE AND ACETAMINOPHEN 1 TABLET: 5; 325 TABLET ORAL at 11:46

## 2017-05-20 RX ADMIN — HYDROCODONE BITARTRATE AND ACETAMINOPHEN 1 TABLET: 5; 325 TABLET ORAL at 16:13

## 2017-05-20 RX ADMIN — Medication 100 MG: at 09:00

## 2017-05-20 RX ADMIN — HYDROCODONE BITARTRATE AND ACETAMINOPHEN 1 TABLET: 5; 325 TABLET ORAL at 06:31

## 2017-05-20 RX ADMIN — PANTOPRAZOLE SODIUM 40 MG: 40 TABLET, DELAYED RELEASE ORAL at 09:00

## 2017-05-20 NOTE — PLAN OF CARE
Problem: Goal Outcome Summary  Goal: Goal Outcome Summary  Outcome: Improving  GAIT/AMBULATION: 100 ft with CGA and FWW with increased time and encouragement. Increased pain in R vs L calf today once in standing. Gait mechanics improved towards end of walk with less favoring of R LE  EQUIPMENT NEEDS AT DISCHARGE: FWW  D/C RECOMMENDATIONS: SNF for rehab  COMMENTS: Performed bed mobility tasks and supine to sit IND today.  PT will continue to follow progressive gait and mobility.  PT will plan to see patient around 10:30am tomorrow (5/21/2017)

## 2017-05-20 NOTE — PLAN OF CARE
Problem: Goal Outcome Summary  Goal: Goal Outcome Summary  Outcome: Improving  Pt appropriate answering questions correctly has been able to get up at bedside to void strong dark urine. Reports left leg pain and received oral hydrocodone with adequate relief. Pt reports good appetite no nausea. Lower leg still discolored warm pedal pulses present. Pt ambulated with pt gait steady but refused to go for walk with nurse    Problem: Gastrointestinal Bleeding (Adult)  Goal: Signs and Symptoms of Listed Potential Problems Will be Absent or Manageable (Gastrointestinal Bleeding)  Signs and symptoms of listed potential problems will be absent or manageable by discharge/transition of care (reference Gastrointestinal Bleeding (Adult) CPG).   Outcome: Improving    05/20/17 1025   Gastrointestinal Bleeding   Problems Assessed (GI Bleeding) all   Problems Present (GI Bleeding) none         Face to face report given with opportunity to observe patient.    Report given to tali cuenca   5/20/2017  2:59 PM

## 2017-05-20 NOTE — PLAN OF CARE
Face to face report given with opportunity to observe patient.    Report given to Melania Borja   5/19/2017  11:15 PM

## 2017-05-20 NOTE — PLAN OF CARE
"Problem: Goal Outcome Summary  Goal: Goal Outcome Summary  Outcome: No Change  Patient slept through most of the shift. Affect remains very flat. Unable to get patient to voice what is concerning him. Vitals have remained stable BP (!) 122/45 (BP Location: Left arm)  Pulse 71  Temp 99.2  F (37.3  C) (Tympanic)  Resp 16  Ht 1.676 m (5' 6\")  Wt 67.8 kg (149 lb 7.6 oz)  SpO2 98%  BMI 24.13 kg/m2 bruising/rash to patients upper extremities/thighs remains unchanged. 2-3+ pitting edema noted to right lowe extremity only. Denies any nausea, vomiting, pain or discomfort on shift. Declines any dizziness or lightheadedness. No swallowing issues noted.         Problem: Gastrointestinal Bleeding (Adult)  Goal: Signs and Symptoms of Listed Potential Problems Will be Absent or Manageable (Gastrointestinal Bleeding)  Signs and symptoms of listed potential problems will be absent or manageable by discharge/transition of care (reference Gastrointestinal Bleeding (Adult) CPG).   Outcome: No Change    05/19/17 6009   Gastrointestinal Bleeding   Problems Assessed (GI Bleeding) all   Problems Present (GI Bleeding) none         Update: Patient continues to talk about occurences that have not happened. Reports conversations with staff that did not occur and states he is embarrassed for all actions he took during the night. Nothing to noted occurred during the shift. Patient states \"I feel like Im dreaming all the time.\"   Face to face report given with opportunity to observe patient.    Report given to CODY Cruz   5/20/2017  7:17 AM      "

## 2017-05-20 NOTE — PLAN OF CARE
Face to face report given with opportunity to observe patient.    Report given to Cristy OLMSTEAD RN.     Renae Hahn   5/19/2017  7:20 PM

## 2017-05-20 NOTE — PROGRESS NOTES
"[facility]    Hospitalist Progress Note    Date of Service (when I saw the patient): 05/20/2017    Assessment & Plan   Abelardo Escobar is a 56 year old  man who was admitted on 5/17/2017. After, in particular, family noted increasing pallor and weakness over the past several days. He is also noted intermittent dark stools.  Although he had not noted any change in the odor of stools .  A marked melenic odor is noted.  On examining the patient.  Interval history is significant in that the patient reports 4 the last 2 months that he has noticed the insidious onset of shortness of breath and weakness, more noticeable with activity. The patient also noticed in the last several weeks intermittent dark stools. He notes that beginning approximately one month ago he noted a discrete bullous lesion centrally just above the his perineum.  He was able to express dark material from this.  Rapidly after that, beginning within 1-2 days and progressively since then he has had violaceous discoloration of his thighs bilaterally associated with pain on movement and firmness. Late in April.  He was evaluated first at [facility] and subsequently transferred to [facility] in [place].  Based on review of records evaluation centered around his noting sharp left lateral chest wall pain .  Evaluation for acute coronary syndrome was unrevealing.  He underwent nuclear stress test which demonstrated no active ischemia.  Lower extremity duplex showed no evidence of thromboembolic disease.  Skin discoloration was noted, which was then felt to represent an ecchymosis and possibly occult trauma.  Since that time, he was admitted there approximately 4 days and discharged.  He subsequently presented to this emergency department several days later with persistent dyspnea.  Evaluation included  Chest CT in \"angiogram\" protocol which demonstrated no evidence of thromboembolic disease. Radiographic emphysema was noted.  He was " "prescribed a bronchodilator which he has used with mild response, but he has remained quite dyspneic.  He is, however, most bothered by the pain, firmness, and discoloration of his thighs bilaterally.   [facility] to be [facility].  He was transferred to emergency department here and admitted for further evaluation and management.     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 Evaluation included magnetic resonance imaging which shows images, after consultation with the radiologist, felt to be quite consistent with dermatomyositis with inflammation in both thigh muscle groups, as well as in some of the muscle groups of the pelvis. The   location of his skin findings on the medial thighs is not \"classic\" with bilateral lateral thigh location more typical.  Certainly, however, dermatomyositis may present here.  His other findings including pain and weakness are quite consistent with this.  He was evaluated, including CK levels, while at [facility] in [place], with the level not being elevated.  He also reports dyspnea.  While this may be consistent with chronic obstructive airways disease or deconditioning.  This also may reflect neuromuscular weakness in respiratory muscles quite typical with neuromuscular pathology. Bedside PFTs were done with mild obstructive airway disease noted, but MIP and MEP reduced at  -40 cm H20 and 50 cm H20 respectively.   Doubt interstitial lung involvement, although this is possible.  Chest x-ray and chest CT angio, however, is unrevealing. ESR 47 and CRP 34.  Discussed with rheumatology by phone at [facility].  They have recommended that in addition to serologies already requested that muscle biopsy be obtained and that subsequently it would be quite reasonable to begin treating the patient.  Usual " initial regimens are of prednisone 1 mg/kg for 4-6 weeks, then with a slow titration downward.  Patients do not always tolerate this level and duration of treatment, however.  Longer treatment may be associated with steroid myopathy.  In general, this is avoided.  Initially, was felt that best approach may be to initiate treatment and arrange rheumatology follow-up in the near future, at which time evaluation of serologies would be possible.  These results are unlikely to be available in less than several weeks after submitting specimens. Unable to obtain muscle biopsy until 5/23/17. Was going to initiate steroid therapy, but increasing concern when considering the normal CK level which just is not consistent with polymyositis/dermatomyositis. Given patient relatively stable at present, have elected to hold off on steroid therapy which first awaiting results of biopsy. Concern is that if the biopsy is non-diagnostic, the longer he is on steroids, perhaps it may interfere with additional testing and workup. Might also consider myositis associated as paraneoplastic syndrome. Also have sent off HMG CR antibody and JAYLA as well.    2.  Gastrointestinal hemorrhage.  The patient carries a history of peptic ulcer disease.  He also notes epigastric discomfort generally happening after eating and relieved with fairly regular use of milk.  Dyspepsia was noted on the time of his evaluation at [place] and omeprazole was begun then.  Given gastrointestinal hemorrhage.  He was appropriately treated with IV pantoprazole, now changed to an oral route.  Had EGD this morning with patchy lesions noted in the body of the stomach and 2 isolated patches in the antrum, biopsies taken of both regions. No active bleeding discovered.  Certainly his presentation is quite consistent with gastrointestinal hemorrhage.  This may be a lower source, but colon preparation would be more difficult and perhaps if required, this could be arranged as  an outpatient.  Is muscle biopsy is inconclusive, this might be considered further with possibility of myopathy being a presentation of paraneoplastic sydrome. He did receive transfusion of 2 units of packed red blood cells with a favorable response in his hemoglobin level.  Hemoglobin remains stable.    3.  Chronic obstructive pulmonary disease  Prolonged history of tobacco abuse and, certainly on clinical grounds, he appears to at least have a moderate degree of airflow obstruction.  This is supported by bedside PFT results.  For the time being the best approach may be to continue short acting bronchodilators on an as-needed basis.  He has multiple other more urgent issues.  However, pulmonary function testing at some point in the future would be quite reasonable and consideration of a long-acting beta agonist or long-acting antimuscarinic.    4.  Aortic stenosis.  Mild aortic stenosis noted on recent echocardiogram.  Continuing metoprolol.  This also may be beneficial in terms of treatment of primary hypertension,, although admittedly not the agent choice for hypertension alone.    5.  Generalized weakness.  As above, significant degree of his  weakness connected with some type of myopathy or neuromuscular disorder.  He has shown an improved exercise tolerance, initially felt possibly on the basis of transfusion alone.   PT evaluation is ongoing.  He is likely going to need SNF for discharge planning given his profound weakness and limited exercise capacity    6.  Cognitive dysfunction.  The patient relates increasing memory problems.  He is quite frustrated with multiple hospitalizations and medical system involvement with an impression that he is not been able to understand events and explanations.  Short-term recall is intact..  However responses somewhat slowed.  This may reflect acute illness alone.  However, cognitive evaluation by speech therapy may be beneficial in terms of establishing a objective  baseline. They tried once, but patient declined. Will likely try again to see him on Monday    DVT Prophylaxis: initially encouraging active ambulation.  Anticoagulation contraindicated by gastrointestinal hemorrhage and planned procedures. Intermittent pneumatic compression relatively contraindicated by intense inflammatory process in both legs.  Code Status: Full Code    Disposition: Expected discharge in 3 days once further diagnostic procedures are possible, muscle biopsy completed, and a safe discharge planning can be arranged. Unable to find placement in SNF at this point.    Fredo Thacker    Interval History   Awake alert and interactive. No interval developments overnight. Has been up to ambulate with PT.     -Data reviewed today: I reviewed all new labs and imaging results over the last 24 hours.NONE    Peripheral IV 05/17/17 Left (Active)   Site Assessment WDL 5/20/2017 10:00 AM   Line Status Infusing;Checked every 1 hour 5/20/2017 10:00 AM   Phlebitis Scale 0-->no symptoms 5/20/2017 10:00 AM   Infiltration Scale 0 5/20/2017 10:00 AM   Extravasation? No 5/20/2017 10:00 AM   Number of days:3     Line/device assessment(s) completed for medical necessity     Physical Exam   Temp: 99.1  F (37.3  C) Temp src: Tympanic BP: 140/64 Pulse: 70 Heart Rate: 76 Resp: 18 SpO2: 97 % O2 Device: None (Room air)    Vitals:    05/17/17 1930   Weight: 67.8 kg (149 lb 7.6 oz)     Vital Signs with Ranges  Temp:  [98  F (36.7  C)-99.2  F (37.3  C)] 99.1  F (37.3  C)  Pulse:  [70] 70  Heart Rate:  [76-80] 76  Resp:  [16-18] 18  BP: (122-140)/(45-66) 140/64  SpO2:  [97 %-98 %] 97 %  I/O last 3 completed shifts:  In: 700 [P.O.:700]  Out: 1850 [Urine:1850]    Awake, alert, man lying in bed on med/surg.  Speech is clear.  Responses mildly slowed.  Short-term recall intact.  Affect appropriate.  HEENT: Pupils equal, conjugate. No icterus or nystagmus. Oral mucosa moist. No facial asymmetry.   Neck: Supple, jugular veins not  elevated. Trachea midline   Chest: CTA bilaterally, no ronchi/wheezes.   Cardiac:  S1, S2 unremarkable. No S3, S4. . 1/6 crescendo decrescendo murmur at left sternal border.  No significant radiation to carotids.   Abdomen: Soft. No palpation or percussion tenderness. No distention. Normoactive bowel sounds. Liver and spleen not increased in size. No bruits, masses, or pulsations.   Extremities: No lower extremity edema. No change in the violaceous appearance diffusely involving left medial and right lateral and medial thighs and extending distally just below the knees              Neurologic: Mental state above. Motor 5/5 and bilaterally equal. Tone preserved. No fasiculations or tremors. Sensation intact to light touch.     Medications        pantoprazole  40 mg Oral QAM     fluticasone-vilanterol  1 puff Inhalation Daily     sodium chloride (PF)  3 mL Intracatheter Q8H     thiamine tablet 100 mg  100 mg Oral Daily     Data     Recent Labs  Lab 05/20/17  0942 05/19/17  1520 05/18/17  1237 05/18/17  0505 05/17/17  1444   WBC 3.5*  --   --  5.7 7.9   HGB 9.0* 9.1* 9.2* 8.5* 6.6*   MCV 92  --   --  91 96     --   --  200 249   INR  --   --   --  1.06 1.17     --   --  138 138   POTASSIUM 3.9  --   --  3.9 4.1   CHLORIDE 107  --   --  106 105   CO2 24  --   --  26 22   BUN 9  --   --  20 24   CR 0.84  --   --  1.02 1.09   ANIONGAP 8  --   --  6 11   KENRICK 8.1*  --   --  7.8* 8.4*   GLC 96  --   --  87 121*   ALBUMIN  --   --   --  2.2* 2.3*   PROTTOTAL  --   --   --  5.5* 6.1*   BILITOTAL  --   --   --  2.0* 1.4*   ALKPHOS  --   --   --  70 71   ALT  --   --   --  10 11   AST  --   --   --  15 11       Lactic Acid   Date Value Ref Range Status   05/18/2017 1.3 0.4 - 2.0 mmol/L Final   05/17/2017 2.3 (H) 0.4 - 2.0 mmol/L Final     Comment:     SIGNIFICANT RESULT REPORTED TO NICOLETTE CROWE @ 2051 ON 05/17/17 BY [facility]       No results found for this or any previous visit (from the past 24 hour(s)).

## 2017-05-21 ENCOUNTER — APPOINTMENT (OUTPATIENT)
Dept: PHYSICAL THERAPY | Facility: HOSPITAL | Age: 57
DRG: 829 | End: 2017-05-21
Attending: INTERNAL MEDICINE
Payer: COMMERCIAL

## 2017-05-21 LAB
ANION GAP SERPL CALCULATED.3IONS-SCNC: 11 MMOL/L (ref 3–14)
BUN SERPL-MCNC: 10 MG/DL (ref 7–30)
CALCIUM SERPL-MCNC: 8.1 MG/DL (ref 8.5–10.1)
CHLORIDE SERPL-SCNC: 104 MMOL/L (ref 94–109)
CO2 SERPL-SCNC: 24 MMOL/L (ref 20–32)
CREAT SERPL-MCNC: 0.84 MG/DL (ref 0.66–1.25)
ERYTHROCYTE [DISTWIDTH] IN BLOOD BY AUTOMATED COUNT: 15.5 % (ref 10–15)
GFR SERPL CREATININE-BSD FRML MDRD: ABNORMAL ML/MIN/1.7M2
GLUCOSE SERPL-MCNC: 91 MG/DL (ref 70–99)
HCT VFR BLD AUTO: 27.6 % (ref 40–53)
HGB BLD-MCNC: 9.1 G/DL (ref 13.3–17.7)
MCH RBC QN AUTO: 30.1 PG (ref 26.5–33)
MCHC RBC AUTO-ENTMCNC: 33 G/DL (ref 31.5–36.5)
MCV RBC AUTO: 91 FL (ref 78–100)
NUCLEAR IGG TITR SER IF: NORMAL {TITER}
PLATELET # BLD AUTO: 230 10E9/L (ref 150–450)
POTASSIUM SERPL-SCNC: 3.9 MMOL/L (ref 3.4–5.3)
RBC # BLD AUTO: 3.02 10E12/L (ref 4.4–5.9)
SODIUM SERPL-SCNC: 139 MMOL/L (ref 133–144)
WBC # BLD AUTO: 2.8 10E9/L (ref 4–11)

## 2017-05-21 PROCEDURE — 12000000 ZZH R&B MED SURG/OB

## 2017-05-21 PROCEDURE — 25000132 ZZH RX MED GY IP 250 OP 250 PS 637: Performed by: INTERNAL MEDICINE

## 2017-05-21 PROCEDURE — 36415 COLL VENOUS BLD VENIPUNCTURE: CPT | Performed by: INTERNAL MEDICINE

## 2017-05-21 PROCEDURE — 85027 COMPLETE CBC AUTOMATED: CPT | Performed by: INTERNAL MEDICINE

## 2017-05-21 PROCEDURE — 80048 BASIC METABOLIC PNL TOTAL CA: CPT | Performed by: INTERNAL MEDICINE

## 2017-05-21 PROCEDURE — 40000193 ZZH STATISTIC PT WARD VISIT: Performed by: PHYSICAL THERAPIST

## 2017-05-21 PROCEDURE — 99232 SBSQ HOSP IP/OBS MODERATE 35: CPT | Performed by: INTERNAL MEDICINE

## 2017-05-21 PROCEDURE — 97530 THERAPEUTIC ACTIVITIES: CPT | Mod: GP | Performed by: PHYSICAL THERAPIST

## 2017-05-21 RX ADMIN — HYDROCODONE BITARTRATE AND ACETAMINOPHEN 1 TABLET: 5; 325 TABLET ORAL at 14:17

## 2017-05-21 RX ADMIN — HYDROCODONE BITARTRATE AND ACETAMINOPHEN 1 TABLET: 5; 325 TABLET ORAL at 20:54

## 2017-05-21 RX ADMIN — HYDROCODONE BITARTRATE AND ACETAMINOPHEN 1 TABLET: 5; 325 TABLET ORAL at 00:20

## 2017-05-21 RX ADMIN — Medication 100 MG: at 09:46

## 2017-05-21 RX ADMIN — HYDROCODONE BITARTRATE AND ACETAMINOPHEN 1 TABLET: 5; 325 TABLET ORAL at 09:46

## 2017-05-21 RX ADMIN — PANTOPRAZOLE SODIUM 40 MG: 40 TABLET, DELAYED RELEASE ORAL at 06:48

## 2017-05-21 RX ADMIN — HYDROCODONE BITARTRATE AND ACETAMINOPHEN 1 TABLET: 5; 325 TABLET ORAL at 05:45

## 2017-05-21 RX ADMIN — HYDROCODONE BITARTRATE AND ACETAMINOPHEN 1 TABLET: 5; 325 TABLET ORAL at 15:45

## 2017-05-21 NOTE — PLAN OF CARE
Face to face report given with opportunity to observe patient.    Report given to tali cuenca   5/21/2017  2:54 PM

## 2017-05-21 NOTE — PLAN OF CARE
Problem: Goal Outcome Summary  Goal: Goal Outcome Summary  Outcome: No Change  Pt A&O x3. He c/o of pain in the left knee and received norco one tablet twice this shift with relief. Temp 99.3. Lung sounds are clear with fine crackles in the bases. Pt voiding adequately with active bowel sounds and no stools this shift. IV access lost throughout the night and writer attempted twice to replace IV, then passed onto day shift. Pt stated he wanted to possibly take a shower prior to placing IV. Alarms on, pt transfers with assist of one and call light within reach throughout the shift.      Face to face report given with opportunity to observe patient.  Report given to Nancy NICOLE RN.     Amber Alvarado  5/21/2017, 7:42 AM

## 2017-05-21 NOTE — PLAN OF CARE
Sister Felisa Noriega calls to speak with me (RN), ask pt if I can speak to sister about him. Pt states no, says she can call him directly. Also states they had an argument over phone yesterday.

## 2017-05-21 NOTE — PLAN OF CARE
Problem: Goal Outcome Summary  Goal: Goal Outcome Summary  Outcome: Improving  Pt receiving oral hydrocodone for leg pain x2. Pt alert answers questions approprietly. Makes needs known, son came to visit today, pt happy with that. Pt up ambulating to bathroom with walker and voiding without difficulty. Uses call light approprietly.     Problem: Gastrointestinal Bleeding (Adult)  Goal: Signs and Symptoms of Listed Potential Problems Will be Absent or Manageable (Gastrointestinal Bleeding)  Signs and symptoms of listed potential problems will be absent or manageable by discharge/transition of care (reference Gastrointestinal Bleeding (Adult) CPG).   Outcome: Improving    05/21/17 1010   Gastrointestinal Bleeding   Problems Assessed (GI Bleeding) all   Problems Present (GI Bleeding) situational response   Pt shows no sign of internal bleeding, no nausea or emesis.

## 2017-05-21 NOTE — PROGRESS NOTES
"[facility]    Hospitalist Progress Note    Date of Service (when I saw the patient): 05/21/2017    Assessment & Plan   Abelardo Escobar is a 56 year old  man who was admitted on 5/17/2017. After, in particular, family noted increasing pallor and weakness over the past several days. He is also noted intermittent dark stools.  Although he had not noted any change in the odor of stools .  A marked melenic odor is noted.  On examining the patient.  Interval history is significant in that the patient reports 4 the last 2 months that he has noticed the insidious onset of shortness of breath and weakness, more noticeable with activity. The patient also noticed in the last several weeks intermittent dark stools. He notes that beginning approximately one month ago he noted a discrete bullous lesion centrally just above the his perineum.  He was able to express dark material from this.  Rapidly after that, beginning within 1-2 days and progressively since then he has had violaceous discoloration of his thighs bilaterally associated with pain on movement and firmness. Late in April.  He was evaluated first at [facility] and subsequently transferred to [facility] in [place].  Based on review of records evaluation centered around his noting sharp left lateral chest wall pain .  Evaluation for acute coronary syndrome was unrevealing.  He underwent nuclear stress test which demonstrated no active ischemia.  Lower extremity duplex showed no evidence of thromboembolic disease.  Skin discoloration was noted, which was then felt to represent an ecchymosis and possibly occult trauma.  Since that time, he was admitted there approximately 4 days and discharged.  He subsequently presented to this emergency department several days later with persistent dyspnea.  Evaluation included  Chest CT in \"angiogram\" protocol which demonstrated no evidence of thromboembolic disease. Radiographic emphysema was noted.  He was " "prescribed a bronchodilator which he has used with mild response, but he has remained quite dyspneic.  He is, however, most bothered by the pain, firmness, and discoloration of his thighs bilaterally.   [facility] noted to be ac[facility] an[facility].  He was transferred to emergency department here and admitted for further evaluation and management.     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 Evaluation included magnetic resonance imaging which shows images, after consultation with the radiologist, felt to be quite consistent with dermatomyositis with inflammation in both thigh muscle groups, as well as in some of the muscle groups of the pelvis. The   location of his skin findings on the medial thighs is not \"classic\" with bilateral lateral thigh location more typical.  Certainly, however, dermatomyositis may present here.  His other findings including pain and weakness are quite consistent with this.  He was evaluated, including CK levels, while at [facility] in [place], with the level not being elevated.  He also reports dyspnea.  While this may be consistent with chronic obstructive airways disease or deconditioning.  This also may reflect neuromuscular weakness in respiratory muscles quite typical with neuromuscular pathology. Bedside PFTs were done with mild obstructive airway disease noted, but MIP and MEP reduced at  -40 cm H20 and 50 cm H20 respectively.   Doubt interstitial lung involvement, although this is possible.  Chest x-ray and chest CT angio, however, is unrevealing. ESR 47 and CRP 34.  Discussed with rheumatology by phone at [facility].  They have recommended that in addition to serologies already requested that muscle biopsy be obtained and that subsequently it would be quite reasonable to begin treating the patient.  Usual " initial regimens are of prednisone 1 mg/kg for 4-6 weeks, then with a slow titration downward.  Patients do not always tolerate this level and duration of treatment, however.  Longer treatment may be associated with steroid myopathy.  In general, this is avoided.  Initially, was felt that best approach may be to initiate treatment and arrange rheumatology follow-up in the near future, at which time evaluation of serologies would be possible.  These results are unlikely to be available in less than several weeks after submitting specimens. Unable to obtain muscle biopsy until 5/23/17. Was going to initiate steroid therapy, but increasing concern when considering the normal CK level which just is not consistent with polymyositis/dermatomyositis. Given patient relatively stable at present, have elected to hold off on steroid therapy which first awaiting results of biopsy. Concern is that if the biopsy is non-diagnostic, the longer he is on steroids, perhaps it may interfere with additional testing and workup. Might also consider myositis associated as paraneoplastic syndrome. Also have sent off HMG CR antibody and JAYLA as well.    2.  Gastrointestinal hemorrhage.  The patient carries a history of peptic ulcer disease.  He also notes epigastric discomfort generally happening after eating and relieved with fairly regular use of milk.  Dyspepsia was noted on the time of his evaluation at [facility] and omeprazole was begun then.  Given gastrointestinal hemorrhage.  He was appropriately treated with IV pantoprazole, now changed to an oral route.  Had EGD this morning with patchy lesions noted in the body of the stomach and 2 isolated patches in the antrum, biopsies taken of both regions. No active bleeding discovered.  Certainly his presentation is quite consistent with gastrointestinal hemorrhage.  This may be a lower source, but colon preparation would be more difficult and perhaps if required, this could be arranged as  an outpatient.  If muscle biopsy is inconclusive, this might be considered further with possibility of myopathy being a presentation of paraneoplastic sydrome. He did receive transfusion of 2 units of packed red blood cells with a favorable response in his hemoglobin level.  Hemoglobin remains stable.    3.  Chronic obstructive pulmonary disease  Prolonged history of tobacco abuse and, certainly on clinical grounds, he appears to at least have a moderate to severe degree of airflow obstruction.  This is supported by bedside PFT results with FEV1 1.95 (59% predicted), FVC 4.06 (95% predicted) and FEV1/FVC 48 (63% predicted).  For the time being the best approach may be to continue short acting bronchodilators on an as-needed basis.  He has multiple other more urgent issues.  However, pulmonary function testing at some point in the future would be quite reasonable and consideration of a long-acting beta agonist or long-acting antimuscarinic.    4.  Aortic stenosis.  Mild aortic stenosis noted on recent echocardiogram.  Continuing metoprolol.  This also may be beneficial in terms of treatment of primary hypertension,, although admittedly not the agent choice for hypertension alone.    5.  Generalized weakness.  As above, significant degree of his  weakness connected with some type of myopathy or neuromuscular disorder.  He has shown an improved exercise tolerance, initially felt possibly on the basis of transfusion alone.   PT evaluation is ongoing.  He is likely going to need SNF for discharge planning given his profound weakness and limited exercise capacity. Although, patient is not excited about this idea. His strength is picking up. He was able to ambulate the hallway with standby.    6.  Cognitive dysfunction.  The patient relates increasing memory problems.  He is quite frustrated with multiple hospitalizations and medical system involvement with an impression that he is not been able to understand events and  explanations.  Short-term recall is intact..  However responses somewhat slowed.  This may reflect acute illness alone.  However, cognitive evaluation by speech therapy may be beneficial in terms of establishing a objective baseline. They tried once, but patient declined. Will likely try again to see him on Monday    DVT Prophylaxis: initially encouraging active ambulation.  Anticoagulation contraindicated by gastrointestinal hemorrhage and planned procedures. Intermittent pneumatic compression relatively contraindicated by intense inflammatory process in both legs.  Code Status: Full Code    Disposition: Expected discharge in 3 days once further diagnostic procedures are possible, muscle biopsy completed, and a safe discharge planning can be arranged. Unable to find placement in SNF at this point.    Fredo Thacker    Interval History   Awake alert and interactive. No interval developments overnight. Has been up to ambulate with PT.     -Data reviewed today: I reviewed all new labs and imaging results over the last 24 hours.NONE    Peripheral IV 05/21/17 Right Lower forearm (Active)   Number of days:0     Line/device assessment(s) completed for medical necessity     Physical Exam   Temp: 98.6  F (37  C) Temp src: Tympanic BP: 127/62   Heart Rate: 73 Resp: 16 SpO2: 99 % O2 Device: None (Room air)    Vitals:    05/17/17 1930   Weight: 67.8 kg (149 lb 7.6 oz)     Vital Signs with Ranges  Temp:  [98.6  F (37  C)-99.3  F (37.4  C)] 98.6  F (37  C)  Heart Rate:  [69-76] 73  Resp:  [16-18] 16  BP: (127-140)/(62-64) 127/62  SpO2:  [96 %-99 %] 99 %  I/O last 3 completed shifts:  In: 1180 [P.O.:1180]  Out: 1125 [Urine:1125]    Awake, alert, man lying in bed on med/surg.  Speech is clear.  Responses mildly slowed.  Short-term recall intact.  Affect appropriate.  HEENT: Pupils equal, conjugate. No icterus or nystagmus. Oral mucosa moist. No facial asymmetry.   Neck: Supple, jugular veins not elevated. Trachea midline   Chest:  CTA bilaterally, no ronchi/wheezes.   Cardiac:  S1, S2 unremarkable. No S3, S4. . 1/6 crescendo decrescendo murmur at left sternal border.  No significant radiation to carotids.   Abdomen: Soft. No palpation or percussion tenderness. No distention. Normoactive bowel sounds. Liver and spleen not increased in size. No bruits, masses, or pulsations.   Extremities: No lower extremity edema. No change in the violaceous appearance diffusely involving left medial and right lateral and medial thighs and extending distally just below the knees              Neurologic: Mental state above. Motor 5/5 and bilaterally equal. Tone preserved. No fasiculations or tremors. Sensation intact to light touch.     Medications        pantoprazole  40 mg Oral QAM     fluticasone-vilanterol  1 puff Inhalation Daily     sodium chloride (PF)  3 mL Intracatheter Q8H     thiamine tablet 100 mg  100 mg Oral Daily     Data     Recent Labs  Lab 05/21/17  0536 05/20/17  0942 05/19/17  1520  05/18/17  0505 05/17/17  1444   WBC 2.8* 3.5*  --   --  5.7 7.9   HGB 9.1* 9.0* 9.1*  < > 8.5* 6.6*   MCV 91 92  --   --  91 96    221  --   --  200 249   INR  --   --   --   --  1.06 1.17    139  --   --  138 138   POTASSIUM 3.9 3.9  --   --  3.9 4.1   CHLORIDE 104 107  --   --  106 105   CO2 24 24  --   --  26 22   BUN 10 9  --   --  20 24   CR 0.84 0.84  --   --  1.02 1.09   ANIONGAP 11 8  --   --  6 11   KENRICK 8.1* 8.1*  --   --  7.8* 8.4*   GLC 91 96  --   --  87 121*   ALBUMIN  --   --   --   --  2.2* 2.3*   PROTTOTAL  --   --   --   --  5.5* 6.1*   BILITOTAL  --   --   --   --  2.0* 1.4*   ALKPHOS  --   --   --   --  70 71   ALT  --   --   --   --  10 11   AST  --   --   --   --  15 11   < > = values in this interval not displayed.    Lactic Acid   Date Value Ref Range Status   05/18/2017 1.3 0.4 - 2.0 mmol/L Final   05/17/2017 2.3 (H) 0.4 - 2.0 mmol/L Final     Comment:     SIGNIFICANT RESULT REPORTED TO NICOLETTE CROWE @ 2051 ON 05/17/17 BY  [facility]       No results found for this or any previous visit (from the past 24 hour(s)).

## 2017-05-21 NOTE — PLAN OF CARE
"Problem: Goal Outcome Summary  Goal: Goal Outcome Summary  Outcome: Improving  GAIT/AMBULATION: Sit<>stand transfer IND and stood IND to use bathroom for 2 minutes with good stability. Ambulated in hallways with CGA and cueing on posture and gait mechanics for heel strike with FWW for 200 ft. Patient reported no L calf/leg pain today, but still tightness and discomfort on the right proximal calf/posterior knee.  Reported \"everything was going well\" today and he had more energy.  D/C RECOMMENDATIONS: SNF   COMMENTS: Patient is improving in ambulation tolerance, mechanics and stability. PT will continue to progress independence with gait, transfers and possibly trial stairs daily during remainder of hospital stay. Current recommendation remains SNF.           "

## 2017-05-21 NOTE — PLAN OF CARE
Problem: Goal Outcome Summary  Goal: Goal Outcome Summary  Outcome: Improving  VSS, temp 99.2, pain 4/10 to L-knee-Norco x 1 rec'd with adequate relief. sats on RA 96%-fine crackle and IW to L-base. R lobes dim. Bowel sounds active x 4 quads-no reports stools.   Edema to L-leg 3+ and trace to R. BLE joselito-scabbed. Pedal pulses 2+-warm to touch. Declines getting up in chair for dinner or ambulating in halls.    Face to face report given with opportunity to observe patient.  Report given to Marilia CARDENAS RN.    Randa Hussein  5/20/2017, 11:07 PM

## 2017-05-22 ENCOUNTER — APPOINTMENT (OUTPATIENT)
Dept: PHYSICAL THERAPY | Facility: HOSPITAL | Age: 57
DRG: 829 | End: 2017-05-22
Payer: COMMERCIAL

## 2017-05-22 ENCOUNTER — APPOINTMENT (OUTPATIENT)
Dept: SPEECH THERAPY | Facility: HOSPITAL | Age: 57
DRG: 829 | End: 2017-05-22
Attending: INTERNAL MEDICINE
Payer: COMMERCIAL

## 2017-05-22 LAB
ANION GAP SERPL CALCULATED.3IONS-SCNC: 7 MMOL/L (ref 3–14)
BUN SERPL-MCNC: 7 MG/DL (ref 7–30)
CALCIUM SERPL-MCNC: 8.3 MG/DL (ref 8.5–10.1)
CHLORIDE SERPL-SCNC: 104 MMOL/L (ref 94–109)
CO2 SERPL-SCNC: 26 MMOL/L (ref 20–32)
COPATH REPORT: NORMAL
CREAT SERPL-MCNC: 0.89 MG/DL (ref 0.66–1.25)
ERYTHROCYTE [DISTWIDTH] IN BLOOD BY AUTOMATED COUNT: 15.5 % (ref 10–15)
GFR SERPL CREATININE-BSD FRML MDRD: 88 ML/MIN/1.7M2
GLUCOSE SERPL-MCNC: 91 MG/DL (ref 70–99)
HCT VFR BLD AUTO: 30.7 % (ref 40–53)
HGB BLD-MCNC: 9.5 G/DL (ref 13.3–17.7)
MCH RBC QN AUTO: 29.1 PG (ref 26.5–33)
MCHC RBC AUTO-ENTMCNC: 30.9 G/DL (ref 31.5–36.5)
MCV RBC AUTO: 94 FL (ref 78–100)
PLATELET # BLD AUTO: 263 10E9/L (ref 150–450)
POTASSIUM SERPL-SCNC: 4.1 MMOL/L (ref 3.4–5.3)
RBC # BLD AUTO: 3.26 10E12/L (ref 4.4–5.9)
SODIUM SERPL-SCNC: 137 MMOL/L (ref 133–144)
WBC # BLD AUTO: 3.1 10E9/L (ref 4–11)

## 2017-05-22 PROCEDURE — 12000000 ZZH R&B MED SURG/OB

## 2017-05-22 PROCEDURE — 80048 BASIC METABOLIC PNL TOTAL CA: CPT | Performed by: INTERNAL MEDICINE

## 2017-05-22 PROCEDURE — 85027 COMPLETE CBC AUTOMATED: CPT | Performed by: INTERNAL MEDICINE

## 2017-05-22 PROCEDURE — 97530 THERAPEUTIC ACTIVITIES: CPT | Mod: GP

## 2017-05-22 PROCEDURE — 36415 COLL VENOUS BLD VENIPUNCTURE: CPT | Performed by: INTERNAL MEDICINE

## 2017-05-22 PROCEDURE — 25000132 ZZH RX MED GY IP 250 OP 250 PS 637: Performed by: INTERNAL MEDICINE

## 2017-05-22 PROCEDURE — 92523 SPEECH SOUND LANG COMPREHEN: CPT | Mod: GN

## 2017-05-22 PROCEDURE — 40000193 ZZH STATISTIC PT WARD VISIT

## 2017-05-22 PROCEDURE — 25000125 ZZHC RX 250: Performed by: INTERNAL MEDICINE

## 2017-05-22 PROCEDURE — 99232 SBSQ HOSP IP/OBS MODERATE 35: CPT | Performed by: INTERNAL MEDICINE

## 2017-05-22 PROCEDURE — 40000225 ZZH STATISTIC SLP WARD VISIT

## 2017-05-22 RX ADMIN — HYDROCODONE BITARTRATE AND ACETAMINOPHEN 2 TABLET: 5; 325 TABLET ORAL at 21:46

## 2017-05-22 RX ADMIN — PANTOPRAZOLE SODIUM 40 MG: 40 TABLET, DELAYED RELEASE ORAL at 06:31

## 2017-05-22 RX ADMIN — TRAMADOL HYDROCHLORIDE 50 MG: 50 TABLET, COATED ORAL at 09:12

## 2017-05-22 RX ADMIN — HYDROCODONE BITARTRATE AND ACETAMINOPHEN 1 TABLET: 5; 325 TABLET ORAL at 06:31

## 2017-05-22 RX ADMIN — HYDROCODONE BITARTRATE AND ACETAMINOPHEN 1 TABLET: 5; 325 TABLET ORAL at 14:19

## 2017-05-22 RX ADMIN — ONDANSETRON 4 MG: 4 TABLET, ORALLY DISINTEGRATING ORAL at 00:49

## 2017-05-22 RX ADMIN — Medication 100 MG: at 09:12

## 2017-05-22 RX ADMIN — HYDROCODONE BITARTRATE AND ACETAMINOPHEN 1 TABLET: 5; 325 TABLET ORAL at 00:49

## 2017-05-22 ASSESSMENT — PAIN DESCRIPTION - DESCRIPTORS
DESCRIPTORS: ACHING

## 2017-05-22 NOTE — PLAN OF CARE
"Problem: Goal Outcome Summary  Goal: Goal Outcome Summary  Outcome: No Change  Patient slept well on shift. Awoke for the day around 0500. Patients spirits seem to be much higher this morning. Patient is up in bed, smiling and laughing. Got cleaned up and brushed teeth as well. Bruising remains noted to upper thighs with areas of yellow present although discoloratin seems to be improving. Stiffened/hardened areas noted in upper thighs. Patient stated these areas became hard following ambulation. States adequate pain control was achieved on shift. Vitals remain stable. /51 (BP Location: Left arm)  Pulse 70  Temp 98.5  F (36.9  C) (Tympanic)  Resp 16  Ht 1.676 m (5' 6\")  Wt 67.8 kg (149 lb 7.6 oz)  SpO2 98%  BMI 24.13 kg/m2 IV remains saline locked.         Problem: Gastrointestinal Bleeding (Adult)  Goal: Signs and Symptoms of Listed Potential Problems Will be Absent or Manageable (Gastrointestinal Bleeding)  Signs and symptoms of listed potential problems will be absent or manageable by discharge/transition of care (reference Gastrointestinal Bleeding (Adult) CPG).   Outcome: No Change    05/21/17 1553   Gastrointestinal Bleeding   Problems Assessed (GI Bleeding) all   Problems Present (GI Bleeding) situational response         Face to face report given with opportunity to observe patient.    Report given to CODY [facility]lett   5/22/2017  7:29 AM      "

## 2017-05-22 NOTE — PLAN OF CARE
Problem: Goal Outcome Summary  Goal: Goal Outcome Summary  GAIT/AMBULATION: Pt ambulated with FWW CGA1 300 feet no unsafe manuevers with the walker.  EQUIPMENT NEEDS AT DISCHARGE: FWW  D/C RECOMMENDATIONS: SNF  TREATMENT AT D/C: N/A  COMMENTS: Pt was willing to participate and improved with mobility. PT still recommending short term stay at SNF.

## 2017-05-22 NOTE — PROGRESS NOTES
Met with patient, he is getting anxious to return home. Mentioned short term rehab to get stronger before returning home, he shared that he will not go anywhere but home. Mentioned homecare come to the house to assist him, he agreed to having physical therapy at home. He has five steps to enter his home, and in the back there are three steps to enter on the deck.     He does not want anything additional in services. He will continue with his son Fortino or sister Theron getting his groceries 1-2 times a month. He has information on meals on wheels, but is not interested at this time. He does not have a walker at home, he shared he may need one prior to returning home. He plans to go walking down his driveway with his dog when he returns home.     [facility] and [facility] that [facility] will need [facility] when returning home.

## 2017-05-22 NOTE — PROGRESS NOTES
"[facility]    Hospitalist Progress Note    Date of Service (when I saw the patient): 05/22/2017    Assessment & Plan   Abelardo Escobar is a 56 year old  man who was admitted on 5/17/2017. After, in particular, family noted increasing pallor and weakness over the past several days. He is also noted intermittent dark stools.  Although he had not noted any change in the odor of stools .  A marked melenic odor is noted.  On examining the patient.  Interval history is significant in that the patient reports 4 the last 2 months that he has noticed the insidious onset of shortness of breath and weakness, more noticeable with activity. The patient also noticed in the last several weeks intermittent dark stools. He notes that beginning approximately one month ago he noted a discrete bullous lesion centrally just above the his perineum.  He was able to express dark material from this.  Rapidly after that, beginning within 1-2 days and progressively since then he has had violaceous discoloration of his thighs bilaterally associated with pain on movement and firmness. Late in April.  He was evaluated first at [facility] and subsequently transferred to [facility] in [place].  Based on review of records evaluation centered around his noting sharp left lateral chest wall pain .  Evaluation for acute coronary syndrome was unrevealing.  He underwent nuclear stress test which demonstrated no active ischemia.  Lower extremity duplex showed no evidence of thromboembolic disease.  Skin discoloration was noted, which was then felt to represent an ecchymosis and possibly occult trauma.  Since that time, he was admitted there approximately 4 days and discharged.  He subsequently presented to this emergency department several days later with persistent dyspnea.  Evaluation included  Chest CT in \"angiogram\" protocol which demonstrated no evidence of thromboembolic disease. Radiographic emphysema was noted.  He was " "prescribed a bronchodilator which he has used with mild response, but he has remained quite dyspneic.  He is, however, most bothered by the pain, firmness, and discoloration of his thighs bilaterally.   [facility] was noted to be acutely anemic.  He was transferred to emergency department here and admitted for further evaluation and management.     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 Evaluation included magnetic resonance imaging which shows images, after consultation with the radiologist, felt to be quite consistent with dermatomyositis with inflammation in both thigh muscle groups, as well as in some of the muscle groups of the pelvis. The   location of his skin findings on the medial thighs is not \"classic\" with bilateral lateral thigh location more typical.  Certainly, however, dermatomyositis may present here.  His other findings including pain and weakness are quite consistent with this.  He was evaluated, including CK levels, while at [facility] in [place], with the level not being elevated.  He also reports dyspnea.  While this may be consistent with chronic obstructive airways disease or deconditioning.  This also may reflect neuromuscular weakness in respiratory muscles quite typical with neuromuscular pathology. Bedside PFTs were done with mild obstructive airway disease noted, but MIP and MEP reduced at  -40 cm H20 and 50 cm H20 respectively.   Doubt interstitial lung involvement, although this is possible.  Chest x-ray and chest CT angio, however, is unrevealing. ESR 47 and CRP 34.  Discussed with rheumatology by phone at [facility].  They have recommended that in addition to serologies already requested that muscle biopsy be obtained and that subsequently it would be quite reasonable to begin treating the patient.  Usual " initial regimens are of prednisone 1 mg/kg for 4-6 weeks, then with a slow titration downward.  Patients do not always tolerate this level and duration of treatment, however.  Longer treatment may be associated with steroid myopathy.  In general, this is avoided.  Initially, was felt that best approach may be to initiate treatment and arrange rheumatology follow-up in the near future, at which time evaluation of serologies would be possible.  These results are unlikely to be available in less than several weeks after submitting specimens. Unable to obtain muscle biopsy until 5/23/17. Was going to initiate steroid therapy, but increasing concern when considering the normal CK level which just is not consistent with polymyositis/dermatomyositis. Given patient relatively stable at present, have elected to hold off on steroid therapy while first awaiting results of biopsy. Concern is that if the biopsy is non-diagnostic, the longer he is on steroids, perhaps it may interfere with additional testing and workup. Might also consider myositis associated as paraneoplastic syndrome. Also have sent off [facility] CR antibody and JAYLA as well. Plan for muscle biopsy tomorrow with Dr. Schaefer    2.  Gastrointestinal hemorrhage.  The patient carries a history of peptic ulcer disease.  He also notes epigastric discomfort generally happening after eating and relieved with fairly regular use of milk.  Dyspepsia was noted on the time of his evaluation at [place] and omeprazole was begun then.  Given gastrointestinal hemorrhage.  He was appropriately treated with IV pantoprazole, now changed to an oral route.  Had EGD this morning with patchy lesions noted in the body of the stomach and 2 isolated patches in the antrum, biopsies taken of both regions. No active bleeding discovered.  Certainly his presentation is quite consistent with gastrointestinal hemorrhage.  This may be a lower source, but colon preparation would be more difficult  and perhaps if required, this could be arranged as an outpatient.  If muscle biopsy is inconclusive, this might be considered further with possibility of myopathy being a presentation of paraneoplastic sydrome. He did receive transfusion of 2 units of packed red blood cells with a favorable response in his hemoglobin level.  Hemoglobin remains stable.    3.  Chronic obstructive pulmonary disease  Prolonged history of tobacco abuse and, certainly on clinical grounds, he appeared to at least have a moderate to severe degree of airflow obstruction.  This is supported by bedside PFT results with FEV1 1.95 (59% predicted), FVC 4.06 (95% predicted) and FEV1/FVC 48 (63% predicted).  For the time being the best approach may be to continue short acting bronchodilators on an as-needed basis.  He has multiple other more urgent issues.  However, pulmonary function testing at some point in the future would be quite reasonable and consideration of a long-acting beta agonist or long-acting antimuscarinic.    4.  Aortic stenosis.  Mild aortic stenosis noted on recent echocardiogram.  Continuing metoprolol.  This also may be beneficial in terms of treatment of primary hypertension,, although admittedly not the agent choice for hypertension alone.    5.  Generalized weakness.  As above, significant degree of his  weakness connected with some type of myopathy or neuromuscular disorder.  He has shown an improved exercise tolerance, initially felt possibly on the basis of transfusion alone.   PT evaluation is ongoing.  He would benefit from SNF for discharge planning given his profound weakness and limited exercise capacity. Has made improvement, now walking 300 ft with FWW, while before he could hardly ambulate to bathroom. Over weekend, while awaiting to find placement, patient has become progressively less agreeable to this option and today is stating he wants to return home. PT still recommending SNF.      6.  Cognitive  dysfunction.  The patient relates increasing memory problems.  He is quite frustrated with multiple hospitalizations and medical system involvement with an impression that he is not been able to understand events and explanations. Responses somewhat slowed. Cognitive eval completed today with impairment noted in short/long term memory, reasoning, organization and executive functioning.  Cannot rule out more acute metabolic encephalopathy related to other underlying acute illness, diagnosis which remains unclear at this point (see above). Also, may represent more chronic effect of alcohol induced toxic encephalopathy.     DVT Prophylaxis: initially encouraging active ambulation.  Anticoagulation contraindicated by gastrointestinal hemorrhage and planned procedures. Intermittent pneumatic compression relatively contraindicated by intense inflammatory process in both legs.  Code Status: Full Code    Disposition: Expected discharge tomorrow after muscle biopsy. Recommending SNF, but patient is not resistant    Sereen ASHIA Thacker    Interval History   Awake alert and interactive. His thighs seem more firm to palpation than previous day.    -Data reviewed today: I reviewed all new labs and imaging results over the last 24 hours.NONE    Peripheral IV 05/21/17 Right Lower forearm (Active)   Site Assessment WDL 5/22/2017  9:03 AM   Line Status Saline locked;Checked every 1-2 hour 5/22/2017  9:03 AM   Phlebitis Scale 0-->no symptoms 5/22/2017  9:03 AM   Infiltration Scale 0 5/22/2017  9:03 AM   Number of days:1     Line/device assessment(s) completed for medical necessity     Physical Exam   Temp: 99.2  F (37.3  C) Temp src: Tympanic BP: 149/67 Pulse: 88 Heart Rate: 78 Resp: 20 SpO2: 97 % O2 Device: None (Room air)    Vitals:    05/17/17 1930   Weight: 67.8 kg (149 lb 7.6 oz)     Vital Signs with Ranges  Temp:  [98.5  F (36.9  C)-99.2  F (37.3  C)] 99.2  F (37.3  C)  Pulse:  [88] 88  Heart Rate:  [73-79] 78  Resp:  [16-20]  20  BP: (108-149)/(51-67) 149/67  SpO2:  [97 %-98 %] 97 %  I/O last 3 completed shifts:  In: 360 [P.O.:360]  Out: 400 [Urine:400]    Awake, alert, man lying in bed on med/surg.  Speech is clear.  Responses mildly slowed.  Short-term recall intact.  Affect appropriate.  HEENT: Pupils equal, conjugate. No icterus or nystagmus. Oral mucosa moist. No facial asymmetry.   Neck: Supple, jugular veins not elevated. Trachea midline   Chest: CTA bilaterally, no ronchi/wheezes.   Cardiac:  S1, S2 unremarkable. No S3, S4. . 1/6 crescendo decrescendo murmur at left sternal border.  No significant radiation to carotids.   Abdomen: Soft. No palpation or percussion tenderness. No distention. Normoactive bowel sounds. Liver and spleen not increased in size. No bruits, masses, or pulsations.   Extremities: No lower extremity edema. No change in the violaceous appearance diffusely involving right lateral and medial thigh but does seem to have less violaceous appearance to the left medial thigh     Photos from 5/19/17:              Neurologic: Mental state above. Motor 5/5 and bilaterally equal. Tone preserved. No fasiculations or tremors. Sensation intact to light touch.     Medications        pantoprazole  40 mg Oral QAM     fluticasone-vilanterol  1 puff Inhalation Daily     sodium chloride (PF)  3 mL Intracatheter Q8H     thiamine tablet 100 mg  100 mg Oral Daily     Data     Recent Labs  Lab 05/22/17  0527 05/21/17  0536 05/20/17  0942  05/18/17  0505 05/17/17  1444   WBC 3.1* 2.8* 3.5*  --  5.7 7.9   HGB 9.5* 9.1* 9.0*  < > 8.5* 6.6*   MCV 94 91 92  --  91 96    230 221  --  200 249   INR  --   --   --   --  1.06 1.17    139 139  --  138 138   POTASSIUM 4.1 3.9 3.9  --  3.9 4.1   CHLORIDE 104 104 107  --  106 105   CO2 26 24 24  --  26 22   BUN 7 10 9  --  20 24   CR 0.89 0.84 0.84  --  1.02 1.09   ANIONGAP 7 11 8  --  6 11   KENRICK 8.3* 8.1* 8.1*  --  7.8* 8.4*   GLC 91 91 96  --  87 121*   ALBUMIN  --   --   --   --   2.2* 2.3*   PROTTOTAL  --   --   --   --  5.5* 6.1*   BILITOTAL  --   --   --   --  2.0* 1.4*   ALKPHOS  --   --   --   --  70 71   ALT  --   --   --   --  10 11   AST  --   --   --   --  15 11   < > = values in this interval not displayed.    Lactic Acid   Date Value Ref Range Status   05/18/2017 1.3 0.4 - 2.0 mmol/L Final   05/17/2017 2.3 (H) 0.4 - 2.0 mmol/L Final     Comment:     SIGNIFICANT RESULT REPORTED TO NICOLETTE CROWE @ 2051 ON 05/17/17 BY [facility]       No results found for this or any previous visit (from the past 24 hour(s)).

## 2017-05-22 NOTE — PLAN OF CARE
"Assessment completed. When entering the room patient stated \"You better not bring a damn basketball in here I am sick of running up and down the cut it makes me have to pee so bad\" When asked about the basketball patient became agitated and stated \"You know exactly what damn basketball I am talking about.\" Issue was not pressed any further. Patient also initially declined the need for pain intervention or nausea intervention. Before staff left the room staff double checked the need fo intervention to which patient stated \"Yea mine as well give me something for the pain and nausea so you can quit coming in my room. PRN norco x1 and PRN zofran x1 given.   "

## 2017-05-22 NOTE — PROGRESS NOTES
Pt was in  Bed upon arrival but was willing to get up and walk wants to go home sooner then later. Pt walked 300 feet with a FWW CGA1 no LOB or unsteadiness was safe maneuvering the walker.

## 2017-05-22 NOTE — PLAN OF CARE
Problem: Goal Outcome Summary  Goal: Goal Outcome Summary  Outcome: No Change  COMMUNICATION: Patient is able to communicate basic wants and needs.   DIET: Regular  STRATEGIES FOR SAFE SWALLOW: N/a  COGNITION: Patient demonstrates mild-moderate cognitive deficits. Patient does well with simple tasks but as items increase in complexity patient struggles to complete task. Patient has limited insight into his deficits.   COMMENTS: Recommend acute care facility.

## 2017-05-22 NOTE — PROGRESS NOTES
05/22/17 0900   General Information, SLP   Type of Evaluation Cognitive-Linguistic   Type of Visit Initial   Start of Care Date 05/22/17   Onset of Illness/Injury or Date of Surgery - Date 05/17/17   Referring Physician Dr. Perez    Patient/Family Goals Statement Patient wants to go home.    Pertinent History of Current Problem Patient is a 56 year old male who was admitted with generalized weakness and palor over the past several days.    Precautions/Limitations no known precautions/limitations   Oral Motor Sensory Function   Deficits noted in Labial Function (m-VII, S-V) None   Deficits noted in Lingual Function (m-XII, s-V, VII, IX, XII) None   Deficits noted in Mandibular Function (m-V) None   Deficits noted in Velar Function (m-V, X, XI, s-IX) None   Deficits noted in Laryngeal Function (m-X, s-X) None   Functional Assessment Scale (Oral Motor) No Impairment   Cognitive Status Examination   Attention impaired   Behavioral Observations alert;confabulatory;confused;impulsive;inappropriate;delayed processing   Orientation oriented to person, place and time    Short Term Memory impaired   Long Term Memory impaired   Reasoning impaired   Organization Patient demonstrates deficits in organization of information.    Executive Function Deficits Noted impulsivity;insight/awareness;mental flexibility;organization;planning;self-correction;self-monitoring   Additional cognitive-linguistic evaluation indicated  recommend   Standardized cognitive-linguistic assessment completed CLQT   Cognitive Status Exam Comments Patient demonstrates cognitive deficits. With simple items patient did well, as things became more complicated and required more steps patient had a hard time reasoning through them. Patient had minimal insight into his deficits and why he was here and the need for possible placement.    Clinical Impression, SLP Eval   Criteria for Skilled Therapeutic Interventions Met Current level of function same as  previous level of function;No significant expected improvement in functional status   SLP Diagnosis cognitive deficits    Functional limitations due to impairments Patient poses safety concerns if returning home alone.    Predicted Duration of Therapy Intervention (days/wks) eval only   Anticipated Discharge Disposition Acute Rehabilitation Facility   Risks and Benefits of Treatment have been explained. Yes   Patient, Family & other staff in agreement with plan of care Yes   Total Evaluation Time      Total Evaluation Time (Minutes) 30

## 2017-05-22 NOTE — PLAN OF CARE
Problem: Goal Outcome Summary  Goal: Goal Outcome Summary  Outcome: No Change  VSS, afebrile, pain rated 5/10 to R-leg-Norco rec'd x 2 doses with adequate relief- declines needing further treatment. BLE purple/joselito-upper thighs-margins appear yellowish as healing bruise would appear. R-inner thigh notably firmer than previous day-states after he walked with PT his thigh started becoming hard also starting to feel firm  On L-inner groin. Dr. Thacker notified and in room to assess. No more walking in halls rest of evening. Only ambulating to bathroom. Edema improved to RLE 2+  Up in shower pt request. Tolerates well.     Face to face report given with opportunity to observe patient.  Report given to CODY Tran.    Randa Hussein  5/21/2017, 11:54 PM

## 2017-05-23 ENCOUNTER — APPOINTMENT (OUTPATIENT)
Dept: PHYSICAL THERAPY | Facility: HOSPITAL | Age: 57
DRG: 829 | End: 2017-05-23
Payer: COMMERCIAL

## 2017-05-23 ENCOUNTER — ANESTHESIA (OUTPATIENT)
Dept: SURGERY | Facility: HOSPITAL | Age: 57
DRG: 829 | End: 2017-05-23
Payer: COMMERCIAL

## 2017-05-23 ENCOUNTER — ANESTHESIA EVENT (OUTPATIENT)
Dept: SURGERY | Facility: HOSPITAL | Age: 57
DRG: 829 | End: 2017-05-23
Payer: COMMERCIAL

## 2017-05-23 LAB
ANA SER QL IA: NORMAL
COPATH REPORT: NORMAL

## 2017-05-23 PROCEDURE — 25000128 H RX IP 250 OP 636: Performed by: SURGERY

## 2017-05-23 PROCEDURE — 25000125 ZZHC RX 250: Performed by: ANESTHESIOLOGY

## 2017-05-23 PROCEDURE — 25000132 ZZH RX MED GY IP 250 OP 250 PS 637: Performed by: INTERNAL MEDICINE

## 2017-05-23 PROCEDURE — 25000128 H RX IP 250 OP 636: Performed by: ANESTHESIOLOGY

## 2017-05-23 PROCEDURE — S0020 INJECTION, BUPIVICAINE HYDRO: HCPCS | Performed by: SURGERY

## 2017-05-23 PROCEDURE — 25000125 ZZHC RX 250: Performed by: SURGERY

## 2017-05-23 PROCEDURE — 20200 MUSCLE BIOPSY SUPERFICIAL: CPT | Performed by: ANESTHESIOLOGY

## 2017-05-23 PROCEDURE — 97530 THERAPEUTIC ACTIVITIES: CPT | Mod: GP

## 2017-05-23 PROCEDURE — 01999 UNLISTED ANES PROCEDURE: CPT | Performed by: NURSE ANESTHETIST, CERTIFIED REGISTERED

## 2017-05-23 PROCEDURE — 99232 SBSQ HOSP IP/OBS MODERATE 35: CPT | Performed by: INTERNAL MEDICINE

## 2017-05-23 PROCEDURE — 20200 MUSCLE BIOPSY SUPERFICIAL: CPT | Performed by: SURGERY

## 2017-05-23 PROCEDURE — 0KBS0ZX EXCISION OF RIGHT LOWER LEG MUSCLE, OPEN APPROACH, DIAGNOSTIC: ICD-10-PCS | Performed by: SURGERY

## 2017-05-23 PROCEDURE — 71000014 ZZH RECOVERY PHASE 1 LEVEL 2 FIRST HR: Performed by: SURGERY

## 2017-05-23 PROCEDURE — 25000128 H RX IP 250 OP 636: Performed by: NURSE ANESTHETIST, CERTIFIED REGISTERED

## 2017-05-23 PROCEDURE — 36000056 ZZH SURGERY LEVEL 3 1ST 30 MIN: Performed by: SURGERY

## 2017-05-23 PROCEDURE — 40000306 ZZH STATISTIC PRE PROC ASSESS II: Performed by: SURGERY

## 2017-05-23 PROCEDURE — 40000193 ZZH STATISTIC PT WARD VISIT

## 2017-05-23 PROCEDURE — 37000009 ZZH ANESTHESIA TECHNICAL FEE, EACH ADDTL 15 MIN: Performed by: SURGERY

## 2017-05-23 PROCEDURE — 12000000 ZZH R&B MED SURG/OB

## 2017-05-23 PROCEDURE — 36000058 ZZH SURGERY LEVEL 3 EA 15 ADDTL MIN: Performed by: SURGERY

## 2017-05-23 PROCEDURE — 71000015 ZZH RECOVERY PHASE 1 LEVEL 2 EA ADDTL HR: Performed by: SURGERY

## 2017-05-23 PROCEDURE — 25000125 ZZHC RX 250: Performed by: NURSE ANESTHETIST, CERTIFIED REGISTERED

## 2017-05-23 PROCEDURE — 37000008 ZZH ANESTHESIA TECHNICAL FEE, 1ST 30 MIN: Performed by: SURGERY

## 2017-05-23 RX ORDER — PROMETHAZINE HYDROCHLORIDE 25 MG/ML
12.5 INJECTION, SOLUTION INTRAMUSCULAR; INTRAVENOUS
Status: CANCELLED | OUTPATIENT
Start: 2017-05-23

## 2017-05-23 RX ORDER — ONDANSETRON 4 MG/1
4 TABLET, ORALLY DISINTEGRATING ORAL EVERY 30 MIN PRN
Status: CANCELLED | OUTPATIENT
Start: 2017-05-23

## 2017-05-23 RX ORDER — SCOLOPAMINE TRANSDERMAL SYSTEM 1 MG/1
1 PATCH, EXTENDED RELEASE TRANSDERMAL ONCE
Status: COMPLETED | OUTPATIENT
Start: 2017-05-23 | End: 2017-05-23

## 2017-05-23 RX ORDER — ONDANSETRON 2 MG/ML
4 INJECTION INTRAMUSCULAR; INTRAVENOUS EVERY 30 MIN PRN
Status: CANCELLED | OUTPATIENT
Start: 2017-05-23

## 2017-05-23 RX ORDER — LABETALOL HYDROCHLORIDE 5 MG/ML
10 INJECTION, SOLUTION INTRAVENOUS
Status: CANCELLED | OUTPATIENT
Start: 2017-05-23

## 2017-05-23 RX ORDER — FENTANYL CITRATE 50 UG/ML
25-50 INJECTION, SOLUTION INTRAMUSCULAR; INTRAVENOUS
Status: CANCELLED | OUTPATIENT
Start: 2017-05-23

## 2017-05-23 RX ORDER — CEFAZOLIN SODIUM 2 G/100ML
2 INJECTION, SOLUTION INTRAVENOUS
Status: COMPLETED | OUTPATIENT
Start: 2017-05-23 | End: 2017-05-23

## 2017-05-23 RX ORDER — KETOROLAC TROMETHAMINE 30 MG/ML
30 INJECTION, SOLUTION INTRAMUSCULAR; INTRAVENOUS EVERY 6 HOURS PRN
Status: CANCELLED | OUTPATIENT
Start: 2017-05-23 | End: 2017-05-28

## 2017-05-23 RX ORDER — NALOXONE HYDROCHLORIDE 0.4 MG/ML
.1-.4 INJECTION, SOLUTION INTRAMUSCULAR; INTRAVENOUS; SUBCUTANEOUS
Status: CANCELLED | OUTPATIENT
Start: 2017-05-23 | End: 2017-05-24

## 2017-05-23 RX ORDER — DEXAMETHASONE SODIUM PHOSPHATE 4 MG/ML
4 INJECTION, SOLUTION INTRA-ARTICULAR; INTRALESIONAL; INTRAMUSCULAR; INTRAVENOUS; SOFT TISSUE EVERY 10 MIN PRN
Status: CANCELLED | OUTPATIENT
Start: 2017-05-23

## 2017-05-23 RX ORDER — OXYCODONE HYDROCHLORIDE 5 MG/1
5 TABLET ORAL EVERY 4 HOURS PRN
Status: DISCONTINUED | OUTPATIENT
Start: 2017-05-23 | End: 2017-05-24 | Stop reason: HOSPADM

## 2017-05-23 RX ORDER — BUPIVACAINE HYDROCHLORIDE 2.5 MG/ML
INJECTION, SOLUTION INFILTRATION; PERINEURAL PRN
Status: DISCONTINUED | OUTPATIENT
Start: 2017-05-23 | End: 2017-05-23 | Stop reason: HOSPADM

## 2017-05-23 RX ORDER — SODIUM CHLORIDE, SODIUM LACTATE, POTASSIUM CHLORIDE, CALCIUM CHLORIDE 600; 310; 30; 20 MG/100ML; MG/100ML; MG/100ML; MG/100ML
INJECTION, SOLUTION INTRAVENOUS CONTINUOUS
Status: DISCONTINUED | OUTPATIENT
Start: 2017-05-23 | End: 2017-05-23 | Stop reason: HOSPADM

## 2017-05-23 RX ORDER — HYDROMORPHONE HYDROCHLORIDE 1 MG/ML
.3-.5 INJECTION, SOLUTION INTRAMUSCULAR; INTRAVENOUS; SUBCUTANEOUS EVERY 10 MIN PRN
Status: CANCELLED | OUTPATIENT
Start: 2017-05-23

## 2017-05-23 RX ORDER — ALBUTEROL SULFATE 0.83 MG/ML
2.5 SOLUTION RESPIRATORY (INHALATION) EVERY 4 HOURS PRN
Status: CANCELLED | OUTPATIENT
Start: 2017-05-23

## 2017-05-23 RX ORDER — PROPOFOL 10 MG/ML
INJECTION, EMULSION INTRAVENOUS PRN
Status: DISCONTINUED | OUTPATIENT
Start: 2017-05-23 | End: 2017-05-23

## 2017-05-23 RX ORDER — MEPERIDINE HYDROCHLORIDE 25 MG/ML
12.5 INJECTION INTRAMUSCULAR; INTRAVENOUS; SUBCUTANEOUS
Status: CANCELLED | OUTPATIENT
Start: 2017-05-23

## 2017-05-23 RX ORDER — LIDOCAINE HYDROCHLORIDE 20 MG/ML
INJECTION, SOLUTION INFILTRATION; PERINEURAL PRN
Status: DISCONTINUED | OUTPATIENT
Start: 2017-05-23 | End: 2017-05-23

## 2017-05-23 RX ORDER — HYDRALAZINE HYDROCHLORIDE 20 MG/ML
2.5-5 INJECTION INTRAMUSCULAR; INTRAVENOUS EVERY 10 MIN PRN
Status: CANCELLED | OUTPATIENT
Start: 2017-05-23

## 2017-05-23 RX ORDER — SODIUM CHLORIDE, SODIUM LACTATE, POTASSIUM CHLORIDE, CALCIUM CHLORIDE 600; 310; 30; 20 MG/100ML; MG/100ML; MG/100ML; MG/100ML
INJECTION, SOLUTION INTRAVENOUS CONTINUOUS
Status: CANCELLED | OUTPATIENT
Start: 2017-05-23

## 2017-05-23 RX ADMIN — PROPOFOL 40 MG: 10 INJECTION, EMULSION INTRAVENOUS at 12:33

## 2017-05-23 RX ADMIN — SCOPALAMINE 1 PATCH: 1 PATCH, EXTENDED RELEASE TRANSDERMAL at 11:35

## 2017-05-23 RX ADMIN — LIDOCAINE HYDROCHLORIDE 40 MG: 20 INJECTION, SOLUTION INFILTRATION; PERINEURAL at 12:14

## 2017-05-23 RX ADMIN — PROPOFOL 50 MG: 10 INJECTION, EMULSION INTRAVENOUS at 12:14

## 2017-05-23 RX ADMIN — PANTOPRAZOLE SODIUM 40 MG: 40 TABLET, DELAYED RELEASE ORAL at 06:31

## 2017-05-23 RX ADMIN — PROPOFOL 40 MG: 10 INJECTION, EMULSION INTRAVENOUS at 12:38

## 2017-05-23 RX ADMIN — HYDROCODONE BITARTRATE AND ACETAMINOPHEN 2 TABLET: 5; 325 TABLET ORAL at 16:48

## 2017-05-23 RX ADMIN — HYDROCODONE BITARTRATE AND ACETAMINOPHEN 2 TABLET: 5; 325 TABLET ORAL at 21:59

## 2017-05-23 RX ADMIN — PROPOFOL 40 MG: 10 INJECTION, EMULSION INTRAVENOUS at 12:23

## 2017-05-23 RX ADMIN — PROPOFOL 50 MG: 10 INJECTION, EMULSION INTRAVENOUS at 12:22

## 2017-05-23 RX ADMIN — PROPOFOL 40 MG: 10 INJECTION, EMULSION INTRAVENOUS at 12:34

## 2017-05-23 RX ADMIN — MIDAZOLAM HYDROCHLORIDE 2 MG: 1 INJECTION, SOLUTION INTRAMUSCULAR; INTRAVENOUS at 12:12

## 2017-05-23 RX ADMIN — HYDROCODONE BITARTRATE AND ACETAMINOPHEN 2 TABLET: 5; 325 TABLET ORAL at 05:03

## 2017-05-23 RX ADMIN — SODIUM CHLORIDE, POTASSIUM CHLORIDE, SODIUM LACTATE AND CALCIUM CHLORIDE: 600; 310; 30; 20 INJECTION, SOLUTION INTRAVENOUS at 11:30

## 2017-05-23 RX ADMIN — CEFAZOLIN SODIUM 2 G: 2 INJECTION, SOLUTION INTRAVENOUS at 12:13

## 2017-05-23 RX ADMIN — PROPOFOL 30 MG: 10 INJECTION, EMULSION INTRAVENOUS at 12:50

## 2017-05-23 RX ADMIN — TRAMADOL HYDROCHLORIDE 50 MG: 50 TABLET, COATED ORAL at 06:31

## 2017-05-23 RX ADMIN — PROPOFOL 60 MG: 10 INJECTION, EMULSION INTRAVENOUS at 12:27

## 2017-05-23 ASSESSMENT — LIFESTYLE VARIABLES: TOBACCO_USE: 1

## 2017-05-23 ASSESSMENT — COPD QUESTIONNAIRES: COPD: 1

## 2017-05-23 NOTE — OP NOTE
DATE OF SERVICE:  2017      PREOPERATIVE DIAGNOSIS:  Dermatomyositis.      POSTOPERATIVE DIAGNOSIS:  Dermatomyositis.      PROCEDURE PERFORMED:  Muscle biopsy of right lower extremity.      INDICATION:  Jim Escobar is a 56-year-old gentleman with 1-month history of progressive muscle weakness associated with pain and ecchymosis, MRI demonstrating inflamed muscles of the lower extremities, with associated edema, here for diagnostic testing.      SURGEON:  Lauri Schaefer DO      WOUND TYPE:  1, clean.      DRAINS:  Zero.      DESCRIPTION OF PROCEDURE:  The patient was brought into the operating room and placed supine on the operating table.  After monitored attended anesthesia was administered, the leg was prepped and draped in the usual sterile fashion.  After preprocedural pause identifying the patient and procedure, position and antibiotics.  Local anesthetic was infiltrated in a longitudinal fashion.  An incision was made and carried down to fascia using a combination of electrocautery and blunt dissection.  The fascia then was sharply incised with a knife, a 4 cm x 1 cm x 1 cm sample of muscle then was sharply divided and sent to pathology.  Hemostasis was achieved using electrocautery.  Copious amount of local anesthetic was infiltrated underneath the fascia within the muscle belly as well as the subcutaneous tissues, fascia was closed with an interrupted 3-0 Vicryl suture, and skin was closed with a running 4-0 Vicryl.  Steri-Strips were applied.  All counts were correct.  The patient tolerated the procedure well and taken to postanesthesia care unit.         LAURI SCHAEFER DO             D: 2017 13:08   T: 2017 17:53   MT: EVAN      Name:     JIM ESCOBAR   MRN:      [id number]        Account:        [id number]   :      1960           Procedure Date: 2017      Document: [id number]
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
## 2017-05-23 NOTE — ANESTHESIA CARE TRANSFER NOTE
Patient: Abelardo Escobar    Procedure(s):  MUSCLE BIOPSY RIGHT LOWER EXTREMITY - Wound Class: I-Clean    Diagnosis: MYOSITIS  Diagnosis Additional Information: No value filed.    Anesthesia Type:   MAC     Note:  Airway :Nasal Cannula  Patient transferred to:PACU        Vitals: (Last set prior to Anesthesia Care Transfer)    CRNA VITALS  5/23/2017 1229 - 5/23/2017 1306      5/23/2017             NIBP: 114/59    Pulse: 68    NIBP Mean: 79                Electronically Signed By: CRYSTAL Victoria CRNA  May 23, 2017  1:06 PM

## 2017-05-23 NOTE — PLAN OF CARE
Problem: Goal Outcome Summary  Goal: Goal Outcome Summary  Outcome: Therapy, progress toward functional goals as expected  GAIT/AMBULATION: Gait with wheeled walker about 150 feet.  EQUIPMENT NEEDS AT DISCHARGE: wheeled walker  D/C RECOMMENDATIONS: homeare with continue PT  TREATMENT AT D/C: wheeled walker  COMMENTS: Continue with PT

## 2017-05-23 NOTE — PROGRESS NOTES
Met with patient, he still plans to return home when discharged from hospital. He is not interested in short-term rehab. He did agree to home care with physical therapy to come to the house. SW called [facility] to update that [facility] would like [facility]care. [facility] also aware [facility] patient will need an appointment scheduled with a rheumatologist right away. [facility] to [facility] for [facility] to see a Rheumatologist.

## 2017-05-23 NOTE — PLAN OF CARE
Problem: Goal Outcome Summary  Goal: Goal Outcome Summary  Outcome: No Change  Is alert and oriented but has been very anxious and agitated at staff this am; later admitted to being upset with sister and not wanting to share any information with her.  Did tell other staff he will be having sister help  groceries after discharging, so unsure of their relationship.  Was taken down this afternoon for a biopsy; returned to floor at 1330.  Dressing to right thigh is CDI with no drainage seen.  Numbing used down in surgery so no c/o pain when brought back up; PRN oxycodone available when requested.  Bruising continued to both thighs.  1-2+ edema to lower legs.  Did walk to bathroom with SBA and walker, orders for crutches after biopsy.  Called therapy around 1600 requesting PT to set up crutches for him.  Was NPO prior to biopsy; now regular diet.  Voiding well; clear yellow output.  IV patent and SL'd.    Face to face report given with opportunity to observe patient.     Report given to CODY Cabrales   5/23/2017  4:15 PM           Problem: Gastrointestinal Bleeding (Adult)  Goal: Signs and Symptoms of Listed Potential Problems Will be Absent or Manageable (Gastrointestinal Bleeding)  Signs and symptoms of listed potential problems will be absent or manageable by discharge/transition of care (reference Gastrointestinal Bleeding (Adult) CPG).   Outcome: No Change    05/23/17 0900   Gastrointestinal Bleeding   Problems Assessed (GI Bleeding) all   Problems Present (GI Bleeding) situational response

## 2017-05-23 NOTE — PLAN OF CARE
Face to face report given with opportunity to observe patient.    Report given to Randa Bates   5/22/2017  11:13 PM

## 2017-05-23 NOTE — PLAN OF CARE
Problem: Goal Outcome Summary  Goal: Goal Outcome Summary  Outcome: Improving  Pt is alert, oriented.  VSS.  Plan for pt to OR this am for biopsies.  Pt aware and cooperative with plan of care.  Pt given norco per request X1 for report of pain.  Pt Hbg remains stable.  No noted blood loss.  Pt calls with all needs.  IV in hand locked, flushed with no difficulty.

## 2017-05-23 NOTE — PLAN OF CARE
Problem: Goal Outcome Summary  Goal: Goal Outcome Summary  Outcome: No Change  Pt  Alert and orientated.   Ate fair for supper.  Up to bathroom with  walker. Does well  With standby assist.  NPO after midnight for  AM procedure.  Uses call light appropriately.  Medicated x1 for generalized  Pain.      Problem: Gastrointestinal Bleeding (Adult)  Goal: Signs and Symptoms of Listed Potential Problems Will be Absent or Manageable (Gastrointestinal Bleeding)  Signs and symptoms of listed potential problems will be absent or manageable by discharge/transition of care (reference Gastrointestinal Bleeding (Adult) CPG).   Outcome: No Change  Pt had  x1 BM- per pt BM soft and normal color

## 2017-05-23 NOTE — PROGRESS NOTES
"[facility]    Hospitalist Progress Note    Date of Service (when I saw the patient): 05/23/2017    Assessment & Plan   Abelardo Escobar is a 56 year old  man who was admitted on 5/17/2017. After, in particular, family noted increasing pallor and weakness over the past several days. He is also noted intermittent dark stools.  Although he had not noted any change in the odor of stools .  A marked melenic odor is noted.  On examining the patient.  Interval history is significant in that the patient reports 4 the last 2 months that he has noticed the insidious onset of shortness of breath and weakness, more noticeable with activity. The patient also noticed in the last several weeks intermittent dark stools. He notes that beginning approximately one month ago he noted a discrete bullous lesion centrally just above the his perineum.  He was able to express dark material from this.  Rapidly after that, beginning within 1-2 days and progressively since then he has had violaceous discoloration of his thighs bilaterally associated with pain on movement and firmness. Late in April.  He was evaluated first at [facility] and subsequently transferred to [facility] in [place].  Based on review of records evaluation centered around his noting sharp left lateral chest wall pain .  Evaluation for acute coronary syndrome was unrevealing.  He underwent nuclear stress test which demonstrated no active ischemia.  Lower extremity duplex showed no evidence of thromboembolic disease.  Skin discoloration was noted, which was then felt to represent an ecchymosis and possibly occult trauma.  Since that time, he was admitted there approximately 4 days and discharged.  He subsequently presented to this emergency department several days later with persistent dyspnea.  Evaluation included  Chest CT in \"angiogram\" protocol which demonstrated no evidence of thromboembolic disease. Radiographic emphysema was noted.  He was " "prescribed a bronchodilator which he has used with mild response, but he has remained quite dyspneic.  He is, however, most bothered by the pain, firmness, and discoloration of his thighs bilaterally.   [facility] noted to be ac[facility] anemic.  He was transferred to emergency department here and admitted for further evaluation and management.     1.  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 He had presented discoloration (with the appearance of bruising/ecchymosis), firmness and pain, particularly on walking, of the right lateral and bilateral medial thighs. Evaluation included magnetic resonance imaging which shows images, after consultation with the radiologist, felt to be quite consistent with dermatomyositis with inflammation in both thigh muscle groups, as well as in some of the muscle groups of the pelvis. The location of his presentation skin findings on the medial thighs is not \"classic\" with bilateral lateral thigh location more typical. Of note, he developed similar findings in the right lateral thigh during this hospitalization.  Also of interest and somewhat difficult to explain given the lack of any specific treatment is decrease in the degree of myalgias and both intensity of skin discoloration as well as muscle induration during this hospitalization dermatomyositis, however, remains the leading concern, although it is difficult to explain the lack of elevation in CK..  His other findings including pain and weakness are quite consistent with this.   He also reports dyspnea.  While this may be consistent with chronic obstructive airways disease or deconditioning.  This also may reflect neuromuscular weakness in respiratory muscles quite typical with neuromuscular pathology. Bedside PFTs were done with mild obstructive airway disease noted, but MIP and MEP reduced at  -40 cm H20 and 50 cm H20 respectively. Difficult to explain his dyspnea on this basis only.  " However, perhaps a combination of airflow obstruction, anemia, and neuromuscular weakness could be an explanation. Doubt interstitial lung involvement, although this is possible.  Chest x-ray and chest CT angio unrevealing. ESR 47 and CRP 34.  Discussed by telephone with several rheumatologist.  The lack of elevation in CK is concerning and the possibility of alternative diagnoses must be considered.  On this basis and because he shown some spontaneous improvement.  Preemptive steroid treatment, not begun.  Multiple serologies pending.  JAYLA, low titer. Muscle biopsy performed 5/23 with pathology pending. In addition to usual serologies directed at dermatomyositis and polymyositis, HMG CR antibody obtain.  As noted, titer of JAYLA low.    2.  Gastrointestinal hemorrhage.  No further evidence of acute blood loss. Presentation with anemia and a history of intermittent dark stools.  Esophagogastroduodenoscopy was unrevealing, although on visual inspection is during endoscopy several abnormal areas of mucosa were identified.  Pathology was unrevealing. He does carry a history of peptic ulcer disease.  He also notes epigastric discomfort generally happening after eating and relieved with fairly regular use of milk. Of interest, these symptoms have not been present during this hospitalization.  Have continued PPI using pantoprazole while inpatient.  Resume his usual omeprazole at the time of discharge.  He has received transfusion of 2 units of packed red blood cells with a favorable response in his hemoglobin level.  Hemoglobin remains stable.    3.  Chronic obstructive pulmonary disease  Prolonged history of tobacco abuse and, certainly on clinical grounds, he appeared to at least have a moderate to severe degree of airflow obstruction.  This is supported by bedside PFT results with FEV1 1.95 (59% predicted), FVC 4.06 (95% predicted) and FEV1/FVC 48 (63% predicted).  For the time being the best approach may be to continue  short acting bronchodilators on an as-needed basis.  He has multiple other more urgent issues.  However, pulmonary function testing at some point in the future would be quite reasonable and consideration of a long-acting beta agonist or long-acting antimuscarinic.    4.  Aortic stenosis.  Mild aortic stenosis noted on recent echocardiogram.  Continuing metoprolol.  This also may be beneficial in terms of treatment of primary hypertension,, although admittedly not the agent choice for hypertension alone.    5.  Generalized weakness.  Continuing slow improvement withphysical therapy.  This suggested a degree of his weakness is on the basis of deconditioning.  As above, significant degree of his  weakness connected with some type of myopathy or neuromuscular disorder.  He has shown an improved exercise tolerance, initially felt possibly on the basis of transfusion alone.   PT evaluation is ongoing.  He would benefit from SNF for discharge planning given his profound weakness and limited exercise capacity. Has made improvement, now walking 300 ft with FWW, while before he could hardly ambulate to bathroom. A skilled nursing facility placement still recommended but the patient quite anxious to attempt at least a trial of home therapy    6.  Cognitive dysfunction.  Cognitive evaluation has shown limitation in executive function, impulsiveness, and confabulation.  Unfortunately, few specific interventions seem indicated.  Continued close outpatient follow-up would certainly be beneficial.   The patient relates increasing memory problems.  He is quite frustrated with multiple hospitalizations and medical system involvement with an impression that he is not been able to understand events and explanations. Responses somewhat slowed. Cognitive eval completed today with impairment noted in short/long term memory, reasoning, organization and executive functioning.  Cannot rule out more acute metabolic encephalopathy related to  other underlying acute illness, diagnosis which remains unclear at this point (see above). Also, may represent more chronic effect of alcohol induced toxic encephalopathy.     Code Status: Full Code    Disposition: Expected discharge tomorrow after muscle biopsy. Recommending SNF, but patient is not resistant    Tim Kam    Interval History   Awake alert and interactive. Compared to his initial evaluation, now after a period of approximately a week while he still shows induration in medial thighs.  The intensity of this is markedly diminished as is the degree of skin discoloration.  No dyspnea.  Improved exercise tolerance.  Excellent appetite.    -Data reviewed today: I reviewed all new labs and imaging results over the last 24 hours.    Peripheral IV 05/21/17 Right Lower forearm (Active)   Site Assessment WDL 5/23/2017  9:00 AM   Line Status Saline locked 5/23/2017  9:00 AM   Phlebitis Scale 0-->no symptoms 5/23/2017  9:00 AM   Infiltration Scale 0 5/23/2017  9:00 AM   Number of days:2       Incision/Surgical Site 05/23/17 Right Leg (Active)   Incision Assessment WDL 5/23/2017  1:00 PM   Closure Adhesive strip(s);Sutures;Approximated 5/23/2017  1:00 PM   Number of days:0     Line/device assessment(s) completed for medical necessity     Physical Exam   Temp: 97.5  F (36.4  C) Temp src: Tympanic BP: 154/62 Pulse: 68 Heart Rate: 68 Resp: 18 SpO2: 98 % O2 Device: None (Room air)    Vitals:    05/17/17 1930   Weight: 67.8 kg (149 lb 7.6 oz)     Vital Signs with Ranges  Temp:  [97.5  F (36.4  C)-99  F (37.2  C)] 97.5  F (36.4  C)  Pulse:  [68] 68  Heart Rate:  [68-80] 68  Resp:  [16-18] 18  BP: (131-163)/(41-71) 154/62  SpO2:  [97 %-99 %] 98 %  I/O last 3 completed shifts:  In: 1100 [P.O.:600; I.V.:500]  Out: 325 [Urine:325]    Awake, alert, man lying in bed on med/surg.  Speech is clear.  Responses Not significantly slowed.  Short-term recall intact.  Affect appropriate.  HEENT: Pupils equal, conjugate. No  icterus or nystagmus. Oral mucosa moist. No facial asymmetry.   Neck: Supple, jugular veins not elevated. Trachea midline   Chest: CTA bilaterally, no ronchi/wheezes.   Cardiac:  S1, S2 unremarkable. No S3, S4. . 1/6 crescendo decrescendo murmur at left sternal border.  No significant radiation to carotids.   Abdomen: Soft. No palpation or percussion tenderness. No distention. Normoactive bowel sounds. Liver and spleen not increased in size. No bruits, masses, or pulsations.   Extremities: No lower extremity edema. diminished violaceous appearance diffusely involving right lateral and medial thigh compared to admission (no lateral findings on admission)     Photos from 5/19/17:              Neurologic: Mental state above. Motor 5/5 and bilaterally equal. Tone preserved. No fasiculations or tremors. Sensation intact to light touch.     Medications        pantoprazole  40 mg Oral QAM     fluticasone-vilanterol  1 puff Inhalation Daily     sodium chloride (PF)  3 mL Intracatheter Q8H     thiamine tablet 100 mg  100 mg Oral Daily     Data     Recent Labs  Lab 05/22/17  0527 05/21/17  0536 05/20/17  0942  05/18/17  0505 05/17/17  1444   WBC 3.1* 2.8* 3.5*  --  5.7 7.9   HGB 9.5* 9.1* 9.0*  < > 8.5* 6.6*   MCV 94 91 92  --  91 96    230 221  --  200 249   INR  --   --   --   --  1.06 1.17    139 139  --  138 138   POTASSIUM 4.1 3.9 3.9  --  3.9 4.1   CHLORIDE 104 104 107  --  106 105   CO2 26 24 24  --  26 22   BUN 7 10 9  --  20 24   CR 0.89 0.84 0.84  --  1.02 1.09   ANIONGAP 7 11 8  --  6 11   KENRICK 8.3* 8.1* 8.1*  --  7.8* 8.4*   GLC 91 91 96  --  87 121*   ALBUMIN  --   --   --   --  2.2* 2.3*   PROTTOTAL  --   --   --   --  5.5* 6.1*   BILITOTAL  --   --   --   --  2.0* 1.4*   ALKPHOS  --   --   --   --  70 71   ALT  --   --   --   --  10 11   AST  --   --   --   --  15 11   < > = values in this interval not displayed.    Lactic Acid   Date Value Ref Range Status   05/18/2017 1.3 0.4 - 2.0 mmol/L Final    05/17/2017 2.3 (H) 0.4 - 2.0 mmol/L Final     Comment:     SIGNIFICANT RESULT REPORTED TO NICOLETTE CROWE @ 2051 ON 05/17/17 BY [facility]       No results found for this or any previous visit (from the past 24 hour(s)).

## 2017-05-23 NOTE — ANESTHESIA PREPROCEDURE EVALUATION
Anesthesia Evaluation     . Pt has had prior anesthetic.     No history of anesthetic complications          ROS/MED HX    ENT/Pulmonary:     (+)tobacco use, Current use Quit 5/13/2017, o/w 1.0 PPD packs/day  COPD, , . .    Neurologic:     (+)other neuro Increasing memory problems, cognitive dysfunction    Cardiovascular:     (+) Dyslipidemia, hypertension-range: on beta blocker (metoprolol), -CAD, --. : . . WATSON, . :. valvular problems/murmurs type: AS Mild:. 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 w/ progressive weakness      GI/Hepatic:     (+) GERD hepatitis type Alcoholic, liver disease, Other GI/Hepatic Upper GIB admit 5/19/2017      Renal/Genitourinary:  - ROS Renal section negative       Endo:  - neg endo ROS       Psychiatric:  - neg psychiatric ROS       Infectious Disease:  - neg infectious disease ROS       Malignancy:      - no malignancy   Other: Comment: EtOH Abuse/Dependency                    Physical Exam  Normal systems: cardiovascular    Airway   Mallampati: III  TM distance: >3 FB  Neck ROM: full    Dental   (+) missing and chipped    Cardiovascular   Rhythm and rate: regular and normal      Pulmonary    breath sounds clear to auscultation(+) decreased breath sounds                       Anesthesia Plan      History & Physical Review  History and physical reviewed and following examination; no interval change.    ASA Status:  4 emergent.    NPO Status:  > 8 hours    Plan for MAC with Intravenous and Propofol induction. Maintenance will be TIVA.  Reason for MAC:  Deep or  markedly invasive procedure (G8), Chronic cardiopulmonary disease (G9), Extreme anxiety (QS) and Difficulty with conscious sedation (QS)  PONV prophylaxis:  Ondansetron (or other 5HT-3), Scopolamine patch and Dexamethasone or Solumedrol       Postoperative Care  Postoperative pain management:  Oral pain medications and IV analgesics.      Consents  Anesthetic plan, risks, benefits and alternatives discussed with:  Patient..                          .

## 2017-05-24 ENCOUNTER — APPOINTMENT (OUTPATIENT)
Dept: PHYSICAL THERAPY | Facility: HOSPITAL | Age: 57
DRG: 829 | End: 2017-05-24
Payer: COMMERCIAL

## 2017-05-24 VITALS
OXYGEN SATURATION: 97 % | SYSTOLIC BLOOD PRESSURE: 121 MMHG | DIASTOLIC BLOOD PRESSURE: 48 MMHG | TEMPERATURE: 98.5 F | WEIGHT: 149.47 LBS | BODY MASS INDEX: 24.02 KG/M2 | RESPIRATION RATE: 18 BRPM | HEART RATE: 75 BPM | HEIGHT: 66 IN

## 2017-05-24 LAB
ANION GAP SERPL CALCULATED.3IONS-SCNC: 8 MMOL/L (ref 3–14)
BASOPHILS # BLD AUTO: 0 10E9/L (ref 0–0.2)
BASOPHILS NFR BLD AUTO: 0.6 %
BUN SERPL-MCNC: 8 MG/DL (ref 7–30)
CALCIUM SERPL-MCNC: 8.8 MG/DL (ref 8.5–10.1)
CHLORIDE SERPL-SCNC: 101 MMOL/L (ref 94–109)
CO2 SERPL-SCNC: 27 MMOL/L (ref 20–32)
CREAT SERPL-MCNC: 0.98 MG/DL (ref 0.66–1.25)
DIFFERENTIAL METHOD BLD: ABNORMAL
EOSINOPHIL # BLD AUTO: 0.1 10E9/L (ref 0–0.7)
EOSINOPHIL NFR BLD AUTO: 1.4 %
ERYTHROCYTE [DISTWIDTH] IN BLOOD BY AUTOMATED COUNT: 15.8 % (ref 10–15)
GFR SERPL CREATININE-BSD FRML MDRD: 79 ML/MIN/1.7M2
GLUCOSE SERPL-MCNC: 92 MG/DL (ref 70–99)
HCT VFR BLD AUTO: 33.2 % (ref 40–53)
HGB BLD-MCNC: 10.6 G/DL (ref 13.3–17.7)
IMM GRANULOCYTES # BLD: 0 10E9/L (ref 0–0.4)
IMM GRANULOCYTES NFR BLD: 0.3 %
LYMPHOCYTES # BLD AUTO: 1.2 10E9/L (ref 0.8–5.3)
LYMPHOCYTES NFR BLD AUTO: 34.3 %
MCH RBC QN AUTO: 30.3 PG (ref 26.5–33)
MCHC RBC AUTO-ENTMCNC: 31.9 G/DL (ref 31.5–36.5)
MCV RBC AUTO: 95 FL (ref 78–100)
MONOCYTES # BLD AUTO: 0.5 10E9/L (ref 0–1.3)
MONOCYTES NFR BLD AUTO: 12.9 %
NEUTROPHILS # BLD AUTO: 1.8 10E9/L (ref 1.6–8.3)
NEUTROPHILS NFR BLD AUTO: 50.5 %
NRBC # BLD AUTO: 0 10*3/UL
NRBC BLD AUTO-RTO: 0 /100
PLATELET # BLD AUTO: 302 10E9/L (ref 150–450)
POTASSIUM SERPL-SCNC: 4.3 MMOL/L (ref 3.4–5.3)
PREALB SERPL IA-MCNC: 12 MG/DL (ref 15–45)
RBC # BLD AUTO: 3.5 10E12/L (ref 4.4–5.9)
SODIUM SERPL-SCNC: 136 MMOL/L (ref 133–144)
WBC # BLD AUTO: 3.6 10E9/L (ref 4–11)

## 2017-05-24 PROCEDURE — 25000132 ZZH RX MED GY IP 250 OP 250 PS 637: Performed by: INTERNAL MEDICINE

## 2017-05-24 PROCEDURE — 80048 BASIC METABOLIC PNL TOTAL CA: CPT | Performed by: INTERNAL MEDICINE

## 2017-05-24 PROCEDURE — 99239 HOSP IP/OBS DSCHRG MGMT >30: CPT | Performed by: INTERNAL MEDICINE

## 2017-05-24 PROCEDURE — 85025 COMPLETE CBC W/AUTO DIFF WBC: CPT | Performed by: INTERNAL MEDICINE

## 2017-05-24 PROCEDURE — 84134 ASSAY OF PREALBUMIN: CPT | Performed by: INTERNAL MEDICINE

## 2017-05-24 PROCEDURE — 36415 COLL VENOUS BLD VENIPUNCTURE: CPT | Performed by: INTERNAL MEDICINE

## 2017-05-24 PROCEDURE — 40000193 ZZH STATISTIC PT WARD VISIT

## 2017-05-24 PROCEDURE — 97530 THERAPEUTIC ACTIVITIES: CPT | Mod: GP

## 2017-05-24 RX ORDER — HYDROCODONE BITARTRATE AND ACETAMINOPHEN 5; 325 MG/1; MG/1
1-2 TABLET ORAL EVERY 4 HOURS PRN
Qty: 15 TABLET | Refills: 0 | Status: SHIPPED | OUTPATIENT
Start: 2017-05-24

## 2017-05-24 RX ADMIN — HYDROCODONE BITARTRATE AND ACETAMINOPHEN 2 TABLET: 5; 325 TABLET ORAL at 05:30

## 2017-05-24 RX ADMIN — PANTOPRAZOLE SODIUM 40 MG: 40 TABLET, DELAYED RELEASE ORAL at 06:44

## 2017-05-24 RX ADMIN — Medication 100 MG: at 10:03

## 2017-05-24 ASSESSMENT — PAIN DESCRIPTION - DESCRIPTORS
DESCRIPTORS: SORE

## 2017-05-24 NOTE — PLAN OF CARE
Face to face report given with opportunity to observe patient.    Report given to Genet Trinidad   5/24/2017  7:31 AM

## 2017-05-24 NOTE — PROGRESS NOTES
S: POD #1 Right lower extremity muscle biopsy.  Pain 6/10 before narcotics, 3/10 after.  Ambulates with the aid of a walker.    O:  Temp: 98.6  F (37  C) Temp  Min: 97.5  F (36.4  C)  Max: 98.9  F (37.2  C)  Resp: 18 Resp  Min: 16  Max: 18  SpO2: 97 % SpO2  Min: 97 %  Max: 99 %  Heart Rate: 76 Heart Rate  Min: 68  Max: 76  BP: 118/50 Systolic (24hrs), Av , Min:116 , Max:163   Diastolic (24hrs), Av, Min:50, Max:71  Gen: AAOx4, NAD, smiling and joking with me  Ext: No palpable fluid collection, no pain  Wound: surgical bandage in place with no seepage    A/P  #1 S/P Right lower extremity muscle biopsy  #2 ? dermatomyositis     I have spoken with Dr. Perez.  The plan is for discharge today.  I've instructed the patient to remove the bandage tomorrow.  At that point he  may shower Postoperative Day (POD)  #2. There are steri-stirps over the wounds.  Its okay to shower with these on, do not scrub.  Just let the soapy water run over the incisions and pat dry.  The steri-strips will fall off on their own in approximately 2 weeks. Do not immerse incision in water for two weeks.  No baths, hot tubs, pools, rivers, lakes, oceans, etc. Follow up appointment with Dr. Schaefer in 2 weeks for wound check.  Please don't hesitate to call my office with any questions or concerns.  Things to watch for are fevers greater than 101.3, pain uncontrolled by pain narcotics, deep red skin around the incision and puss coming out of the incision.

## 2017-05-24 NOTE — PROGRESS NOTES
Name: Abelardo Escobar    MRN#: [id number]    Reason for Hospitalization: Generalized muscle weakness [M62.81]  Anemia due to blood loss, acute [D62]    TAMI: Average    Discharge Date: May 24, 2017    Patient / Family response to discharge plan: Patient agreeable and understands d/c plan    Follow-[facility] Appt: [facility]    Other Providers (Care Coordinator, County Services, PCA services etc): Yes: - [facility] updated and will be faxed d/c orders    Discharge Disposition: home     Abelardo is returning home, his family will provide transportation. He is aware that he will need to have his follow [facility]ology. [facility] will [facility] with [facility] for [facility] PT.     [place] Mauro

## 2017-05-24 NOTE — PLAN OF CARE
Problem: Goal Outcome Summary  Goal: Goal Outcome Summary  GAIT/AMBULATION: 300ft c no AD today and supervision  EQUIPMENT NEEDS AT DISCHARGE: NA. Pt chooses not to have any AD  D/C RECOMMENDATIONS: SNF was recommended but pt is d/c home c home therapy  TREATMENT AT D/C: Home PT   COMMENTS: Planned to trial other AD today due to pt not wanting to use a walker. However, pt declined using other AD such as a cane. He chooses to discharge without any AD at this time.

## 2017-05-24 NOTE — DISCHARGE INSTRUCTIONS
[facility] [place] [facility]   You have an appointment on June 13th at 11:15AM with Dr. Schaefer at the [facility] for a wound check.    Wound dressing instructions:  Remove the bandage tomorrow.  At that point you  may shower. There are steri-stirps over the wounds.  Its okay to shower with these on, do not scrub.  Just let the soapy water run over the incisions and pat dry.  The steri-strips will fall off on their own in approximately 2 weeks. Do not immerse incision in water for two weeks.  No baths, hot tubs, pools, rivers, lakes, oceans, etc. Watch for fevers greater than 101.3, pain uncontrolled by pain narcotics, deep red skin around the incision and puss coming out of the incision.

## 2017-05-24 NOTE — PLAN OF CARE
"Problem: Goal Outcome Summary  Goal: Goal Outcome Summary  Outcome: Improving  Per pt request pt slept until 0500 when pt awakened. VSS. amb to BR independently w/ walker. No help from staff. Pt is hoping very much to go home today. Again agrees to accepting home care, \"if these people can get their stuff together and get it arranged\"  Pt verbalizes some frustration of planning in the hospital but is cooperative.   Dressing at leg CDI, only some slight discoloration to BLEs noted, pt feels legs are better and strength is better.  norco for biopsy site being \"sore\" this am  scopo patch behind left ear    Problem: Gastrointestinal Bleeding (Adult)  Goal: Signs and Symptoms of Listed Potential Problems Will be Absent or Manageable (Gastrointestinal Bleeding)  Signs and symptoms of listed potential problems will be absent or manageable by discharge/transition of care (reference Gastrointestinal Bleeding (Adult) CPG).   Outcome: Improving    05/24/17 0544   Gastrointestinal Bleeding   Problems Assessed (GI Bleeding) all   Problems Present (GI Bleeding) none           "

## 2017-05-24 NOTE — PLAN OF CARE
Face to face report given with opportunity to observe patient.    Report given to Edith ROSS.    Yuly Morton RN.  5/23/2017  11:04 PM

## 2017-05-24 NOTE — PLAN OF CARE
Problem: Goal Outcome Summary  Goal: Goal Outcome Summary  Physical Therapy Discharge Summary     Reason for therapy discharge:    Discharged to home with home therapy.     Progress towards therapy goal(s). See goals on Care Plan in [facility] electronic health record for goal details.  Goals partially met.  Barriers to achieving goals:   discharge from facility.     Therapy recommendation(s):    Continued therapy is recommended.  Rationale/Recommendations:  Recommended short term rehab stay but pt declined. Also recommended pt discharge home with an assistive device but he is currently declining. Pt ambulated 300ft today c no AD but still needed superivison due to impaired dynamic balance. Pt would be at decreased risk for falls with a cane or walker but pt currently declining to use either AD..

## 2017-05-24 NOTE — PLAN OF CARE
Problem: Goal Outcome Summary  Goal: Goal Outcome Summary  Outcome: Adequate for Discharge Date Met:  05/24/17  VSS. Pt up SBA reporting little to no pain in legs. Dressing is CDI. Lungs clear. Legs have slight discoloration.

## 2017-05-24 NOTE — PLAN OF CARE
Patient discharged at 3:18 PM via ambulation accompanied by son and staff. Prescriptions sent with patient to fill . All belongings sent with patient.     Discharge instructions reviewed with patient. Listed belongings gathered and returned to patient. yes    Patient discharged to Home.   Report called to N/A    Core Measures and Vaccines  Core Measures applicable during stay: No. If yes, state diagnosis: na  Pneumonia and Influenza given prior to discharge, if indicated: N/A    Surgical Patient   Surgical Procedures during stay: Yes  Did patient receive discharge instruction on wound care and recognition of infection symptoms? Yes    MISC  Follow up appointment made:  Yes  Home and hospital aquired medications returned to patient: Yes  Patient reports pain was well managed at discharge: Yes

## 2017-05-25 ENCOUNTER — TELEPHONE (OUTPATIENT)
Dept: CASE MANAGEMENT | Facility: HOSPITAL | Age: 57
End: 2017-05-25

## 2017-05-25 NOTE — ANESTHESIA POSTPROCEDURE EVALUATION
Patient: Abelardo Escobar    Procedure(s):  MUSCLE BIOPSY RIGHT LOWER EXTREMITY - Wound Class: I-Clean    Diagnosis:MYOSITIS  Diagnosis Additional Information: No value filed.    Anesthesia Type:  MAC    Note:  Anesthesia Post Evaluation    Patient location during evaluation: Phase 2 and Bedside  Patient participation: Able to fully participate in evaluation  Level of consciousness: awake and alert  Pain management: adequate  Airway patency: patent  Cardiovascular status: acceptable  Respiratory status: acceptable  Hydration status: stable  PONV: none     Anesthetic complications: None          Last vitals:  Vitals:    05/23/17 1623 05/24/17 0500 05/24/17 0800   BP: 116/53 118/50 (!) 121/48   Pulse:  70 75   Resp: 18 18 18   Temp: 98.9  F (37.2  C) 98.6  F (37  C) 98.5  F (36.9  C)   SpO2: 99% 97% 97%         Electronically Signed By: Wili Galarza MD  May 25, 2017  5:41 AM

## 2017-05-26 ENCOUNTER — TELEPHONE (OUTPATIENT)
Dept: CASE MANAGEMENT | Facility: HOSPITAL | Age: 57
End: 2017-05-26

## 2017-05-26 LAB
RESULT: NORMAL
SEND OUTS MISC TEST CODE: NORMAL
SEND OUTS MISC TEST SPECIMEN: NORMAL
TEST NAME: NORMAL

## 2017-05-30 ENCOUNTER — TRANSFERRED RECORDS (OUTPATIENT)
Dept: HEALTH INFORMATION MANAGEMENT | Facility: HOSPITAL | Age: 57
End: 2017-05-30

## 2017-05-30 LAB
ANNOTATION COMMENT IMP: NORMAL
EJ AB SER QL: NORMAL
ENA JO1 AB TITR SER: 0 {TITER}
MDA5 (CADM 140) ABY: NORMAL
MI2 AB SER QL: NORMAL
NXP-2 (NUCLEAR MATRIX PROTEIN 2) ABY: NORMAL
OJ AB SER QL: NORMAL
P155/140 (TIF1-GAMMA) ANTIBODY: NORMAL
PL12 AB SER QL: NORMAL
PL7 AB SER QL: NORMAL
SAE1 (SUMO ACTIVATING ENZYME) ABY: NORMAL
SRP AB SERPL QL: NORMAL
TIF-1 GAMMA ANTIBODY: NORMAL

## 2017-11-21 ENCOUNTER — APPOINTMENT (OUTPATIENT)
Dept: CT IMAGING | Facility: HOSPITAL | Age: 57
End: 2017-11-21
Attending: FAMILY MEDICINE
Payer: COMMERCIAL

## 2017-11-21 ENCOUNTER — HOSPITAL ENCOUNTER (EMERGENCY)
Facility: HOSPITAL | Age: 57
Discharge: HOME | End: 2017-11-21
Attending: FAMILY MEDICINE | Admitting: FAMILY MEDICINE
Payer: COMMERCIAL

## 2017-11-21 ENCOUNTER — TRANSFERRED RECORDS (OUTPATIENT)
Dept: HEALTH INFORMATION MANAGEMENT | Facility: HOSPITAL | Age: 57
End: 2017-11-21

## 2017-11-21 VITALS
TEMPERATURE: 97.6 F | RESPIRATION RATE: 22 BRPM | BODY MASS INDEX: 25.66 KG/M2 | OXYGEN SATURATION: 98 % | HEART RATE: 69 BPM | SYSTOLIC BLOOD PRESSURE: 130 MMHG | WEIGHT: 159 LBS | DIASTOLIC BLOOD PRESSURE: 65 MMHG

## 2017-11-21 DIAGNOSIS — M31.6 TEMPORAL ARTERITIS (H): ICD-10-CM

## 2017-11-21 LAB
ALBUMIN SERPL-MCNC: 4 G/DL (ref 3.4–5)
ALP SERPL-CCNC: 58 U/L (ref 40–150)
ALT SERPL W P-5'-P-CCNC: 21 U/L (ref 0–70)
ANION GAP SERPL CALCULATED.3IONS-SCNC: 10 MMOL/L (ref 3–14)
AST SERPL W P-5'-P-CCNC: 25 U/L (ref 0–45)
BASOPHILS # BLD AUTO: 0 10E9/L (ref 0–0.2)
BASOPHILS NFR BLD AUTO: 0.6 %
BILIRUB SERPL-MCNC: 0.4 MG/DL (ref 0.2–1.3)
BUN SERPL-MCNC: 8 MG/DL (ref 7–30)
CALCIUM SERPL-MCNC: 9.3 MG/DL (ref 8.5–10.1)
CHLORIDE SERPL-SCNC: 101 MMOL/L (ref 94–109)
CO2 SERPL-SCNC: 24 MMOL/L (ref 20–32)
COHGB MFR BLD: 2.4 % (ref 0–2)
CREAT SERPL-MCNC: 1.03 MG/DL (ref 0.66–1.25)
DIFFERENTIAL METHOD BLD: NORMAL
EOSINOPHIL # BLD AUTO: 0.1 10E9/L (ref 0–0.7)
EOSINOPHIL NFR BLD AUTO: 0.9 %
ERYTHROCYTE [DISTWIDTH] IN BLOOD BY AUTOMATED COUNT: 14.2 % (ref 10–15)
ERYTHROCYTE [SEDIMENTATION RATE] IN BLOOD BY WESTERGREN METHOD: 8 MM/H (ref 0–20)
GFR SERPL CREATININE-BSD FRML MDRD: 75 ML/MIN/1.7M2
GLUCOSE SERPL-MCNC: 82 MG/DL (ref 70–99)
HCT VFR BLD AUTO: 47 % (ref 40–53)
HGB BLD-MCNC: 16.6 G/DL (ref 13.3–17.7)
IMM GRANULOCYTES # BLD: 0 10E9/L (ref 0–0.4)
IMM GRANULOCYTES NFR BLD: 0.2 %
LYMPHOCYTES # BLD AUTO: 1.8 10E9/L (ref 0.8–5.3)
LYMPHOCYTES NFR BLD AUTO: 28.9 %
MCH RBC QN AUTO: 32.7 PG (ref 26.5–33)
MCHC RBC AUTO-ENTMCNC: 35.3 G/DL (ref 31.5–36.5)
MCV RBC AUTO: 93 FL (ref 78–100)
MONOCYTES # BLD AUTO: 0.7 10E9/L (ref 0–1.3)
MONOCYTES NFR BLD AUTO: 10.9 %
NEUTROPHILS # BLD AUTO: 3.7 10E9/L (ref 1.6–8.3)
NEUTROPHILS NFR BLD AUTO: 58.5 %
NRBC # BLD AUTO: 0 10*3/UL
NRBC BLD AUTO-RTO: 0 /100
PLATELET # BLD AUTO: 220 10E9/L (ref 150–450)
POTASSIUM SERPL-SCNC: 3.8 MMOL/L (ref 3.4–5.3)
PROT SERPL-MCNC: 8.2 G/DL (ref 6.8–8.8)
RBC # BLD AUTO: 5.08 10E12/L (ref 4.4–5.9)
SODIUM SERPL-SCNC: 135 MMOL/L (ref 133–144)
WBC # BLD AUTO: 6.3 10E9/L (ref 4–11)

## 2017-11-21 PROCEDURE — 85652 RBC SED RATE AUTOMATED: CPT | Performed by: FAMILY MEDICINE

## 2017-11-21 PROCEDURE — 80053 COMPREHEN METABOLIC PANEL: CPT | Performed by: FAMILY MEDICINE

## 2017-11-21 PROCEDURE — 70450 CT HEAD/BRAIN W/O DYE: CPT | Mod: TC

## 2017-11-21 PROCEDURE — 82375 ASSAY CARBOXYHB QUANT: CPT | Performed by: FAMILY MEDICINE

## 2017-11-21 PROCEDURE — 99284 EMERGENCY DEPT VISIT MOD MDM: CPT | Mod: 25

## 2017-11-21 PROCEDURE — 36415 COLL VENOUS BLD VENIPUNCTURE: CPT | Performed by: FAMILY MEDICINE

## 2017-11-21 PROCEDURE — 25000125 ZZHC RX 250: Performed by: FAMILY MEDICINE

## 2017-11-21 PROCEDURE — 99285 EMERGENCY DEPT VISIT HI MDM: CPT | Performed by: FAMILY MEDICINE

## 2017-11-21 PROCEDURE — 85025 COMPLETE CBC W/AUTO DIFF WBC: CPT | Performed by: FAMILY MEDICINE

## 2017-11-21 RX ORDER — PREDNISONE 20 MG/1
40 TABLET ORAL ONCE
Status: COMPLETED | OUTPATIENT
Start: 2017-11-21 | End: 2017-11-21

## 2017-11-21 RX ORDER — PREDNISONE 20 MG/1
TABLET ORAL
Qty: 10 TABLET | Refills: 0 | Status: SHIPPED | OUTPATIENT
Start: 2017-11-21

## 2017-11-21 RX ADMIN — PREDNISONE 40 MG: 20 TABLET ORAL at 19:36

## 2017-11-21 NOTE — ED NOTES
Ambulated into ED room 4 independently with son at side with c/o headache for one month and shortness of breath for over one year. States he thought headache was from 3 bad teeth he had pulled at the dental office but the pain is still there. Reports he was prescribed penicillin 4 times per day but never got prescription filled as he had some left at home from years ago and just took that once per day. FAST exam negative. Neuros WNL and as charted. Rates headache pain 6/10. Reports taking Aleve at home with no relief. Left lung has very little air movement, right lung is diminished but not as much as left. Reports that he smokes cigarettes and marijuana daily and treats his pain with beer Red Hot 100 proof alcohol. Call light within reach.

## 2017-11-21 NOTE — ED AVS SNAPSHOT
[facility]    [address] [place] [address]    Phone:  [phone]                                       Abelardo Escobar   MRN: [id number]    Department:  [facility]   Date of Visit:  11/21/2017           After Visit Summary Signature Page     I have received my discharge instructions, and my questions have been answered. I have discussed any challenges I see with this plan with the nurse or doctor.    ..........................................................................................................................................  Patient/Patient Representative Signature      ..........................................................................................................................................  Patient Representative Print Name and Relationship to Patient    ..................................................               ................................................  Date                                            Time    ..........................................................................................................................................  Reviewed by Signature/Title    ...................................................              ..............................................  Date                                                            Time

## 2017-11-21 NOTE — ED AVS SNAPSHOT
[facility]    [address]    [place] [address]    Phone:  [phone]                                       Abelardo Escobar   MRN: [id number]    Department:  [facility]   Date of Visit:  11/21/2017           Patient Information     Date Of Birth          1960        Your diagnoses for this visit were:     Temporal arteritis (H)        You were seen by Belinda Brown MD.        Discharge Instructions         Temporal Arteritis  You have temporal arteritis (also called  giant cell arteritis ). This is an inflammation of the blood vessels that supply the head, neck, upper body, and arms. It can be diagnosed by examining a small piece of temporal artery, which is easily accessible. This artery is located in the scalp area just above each ear. When the arteries are inflamed, the vessel narrows, and blood flow slows down. A clot may also form inside the artery, stopping all blood flow. Reduced or blocked blood flow in the arteries that supply vital structures in the eye can cause blindness in the affected eye. In rare cases, stroke may occur.  The cause of temporal arteritis is not known. Symptoms of temporal arteritis include headache, tenderness of the temples or scalp, jaw pain when chewing, muscle aches, fatigue, fever, and unintentional weight loss.  Steroids, such as prednisone, are used to treat this condition. These reduce inflammation in the arteries. Most people begin to feel better a few days after treatment starts. Most people do recover from this condition, but it may require continued treatment for 1 or 2 years. Long-term steroid treatment can cause various serious side effects. These include osteoporosis, high blood pressure, and diabetes. Talk to your healthcare provider about side effects and ways to minimize them.  Home care  Take medicines as directed by your healthcare provider. The following suggestions will help reduce side effects from long-term  steroid use.  Nutrition    Eat plenty of fresh fruits, vegetables, and whole grains.    Choose mostly lean sources of protein.    Limit the use of salt, sugar, and alcohol.    Be sure to get enough calcium and vitamin D. Low-fat dairy foods are an excellent source of these nutrients. If you are not able to eat dairy, you can choose lactose-free products instead. Here are some other foods that contain calcium:    Kale and broccoli    White beans, green beans, and lima beans    [place]    Soybeans or tofu    Fortified juices    You can also take daily calcium supplements. Ask your healthcare provider how much calcium you should take each day.  Exercise  Get regular aerobic exercise, up to 30 minutes on most days. Exercise can help prevent bone loss, heart disease, and diabetes. It also relieves stress and can improve your mood and your overall quality of life. If you don t already exercise regularly, ask your healthcare provider for help setting up a program.  Follow-up care  Follow up with your healthcare provider, or as advised.  For more information about temporal arteritis, see:    National [place] of Arthritis and Musculoskeletal and Skin Diseases, www.niams.nih.gov    The Arthritis Foundation, www.arthritis.org  When to seek medical advice  Call your healthcare provider right away if any of these occur:    Worsening symptoms    Numbness or weakness of the face, one arm, or one leg    Slurred speech, confusion, or trouble speaking or walking    Severe headache    Fainting spell, dizziness, or seizure    Pain in the chest, arm, neck, or upper back    Sudden loss or change in vision  Date Last Reviewed: 6/14/2015 2000-2017 [employer]. [address] [place] [address], [place] [address]. All rights reserved. This information is not intended as a substitute for professional medical care. Always follow your healthcare professional's instructions.      [facility]  Tell [facility] that  we started treatment for temporal arteritis.  I have started therapy with prednisone and sent a presciption to [facility] that you can start tomorrow.           Review of your medicines      START taking        Dose / Directions Last dose taken    predniSONE 20 MG tablet   Commonly known as:  DELTASONE   Quantity:  10 tablet        Take two tablets (= 40mg) each day for 5 (five) days   Refills:  0          Our records show that you are taking the medicines listed below. If these are incorrect, please call your family doctor or clinic.        Dose / Directions Last dose taken    HYDROcodone-acetaminophen 5-325 MG per tablet   Commonly known as:  NORCO   Dose:  1-2 tablet   Quantity:  15 tablet        Take 1-2 tablets by mouth every 4 hours as needed for moderate to severe pain   Refills:  0        Ipratropium-Albuterol  MCG/ACT inhaler   Commonly known as:  COMBIVENT RESPIMAT   Dose:  1 puff   Quantity:  1 Inhaler        Inhale 1 puff into the lungs 4 times daily as needed for shortness of breath / dyspnea or wheezing Not to exceed 6 doses per day.   Refills:  1        METOPROLOL TARTRATE PO   Dose:  50 mg        Take 50 mg by mouth daily   Refills:  0        OMEPRAZOLE PO   Dose:  20 mg        Take 20 mg by mouth every morning Take on an empty stomach, at least 30 minutes prior to a meal for stomach acid.   Refills:  0        THIAMINE HCL PO   Dose:  100 mg        Take 100 mg by mouth daily   Refills:  0        traMADol 50 MG tablet   Commonly known as:  ULTRAM   Dose:  50 mg        Take 50 mg by mouth every 8 hours as needed for moderate pain   Refills:  0                Prescriptions were sent or printed at these locations (1 Prescription)                   [id number]  [address] - [address] [facility] [address] [address] [place] [address]    Telephone:  [phone]   Fax:  [phone]   Hours:                  E-Prescribed (1 of 1)         predniSONE (DELTASONE) 20 MG tablet                Procedures and  "tests performed during your visit     CBC with platelets differential    CT Head w/o Contrast    Carbon monoxide    Comprehensive metabolic panel    Erythrocyte sedimentation rate auto      Orders Needing Specimen Collection     None      Pending Results     No orders found from 2017 to 2017.            Pending Culture Results     No orders found from 2017 to 2017.            Thank you for choosing [facility]       Thank you for choosing [facility] for your care. Our goal is always to provide you with excellent care. Hearing back from our patients is one way we can continue to improve our services. Please take a few minutes to complete the written survey that you may receive in the mail after you visit with us. Thank you!        [employer] Information     [employer] lets you send messages to your doctor, view your test results, renew your prescriptions, schedule appointments and more. To sign up, go to [email or web address] . Click on \"Log in\" on the left side of the screen, which will take you to the Welcome page. Then click on \"Sign up Now\" on the right side of the page.     You will be asked to enter the access code listed below, as well as some personal information. Please follow the directions to create your username and password.     Your access code is: [email or web address]  Expires: 2018  7:33 PM     Your access code will  in 90 days. If you need help or a new code, please call your [facility] clinic or [phone].        Care EveryWhere ID     This is your Care EveryWhere ID. This could be used by other organizations to access your [facility] medical records  [id number]        Equal Access to Services     [facility] AH: Hadii avery hernandez hadasho Soelisaali, waaxda luqadaha, qaybta kaalmada adeegyada, humberto jameson . So [facility] [phone].    ATENCIÓN: Si habla español, tiene a clark disposición servicios gratuitos de asistencia lingüística. Llame al [phone].    We " comply with applicable federal civil rights laws and [place] laws. We do not discriminate on the basis of race, color, national origin, age, disability, sex, sexual orientation, or gender identity.            After Visit Summary       This is your record. Keep this with you and show to your community pharmacist(s) and doctor(s) at your next visit.

## 2017-11-21 NOTE — ED PROVIDER NOTES
"eMERGENCY dEPARTMENT eNCOUnter        CHIEF COMPLAINT    Chief Complaint   Patient presents with     Headache     for a month     Shortness of Breath       HPI    Abelardo Escobar is a 57 year old male who presents with one month history of a headache.  [facility]\". [facility],  Has a history of tobacco and EtOH abuse, has been medicating with 100 proof cinnamon vodka, beer and marijuana. .  Has been short of breath for the past year. No fevers.  He thought it was his teeth and had some teeth pulled but this didn't help.  He ambulates into the emergency department.  He provides the same history of the headache.  He also has had what sounds like endoscopic vascular repair of the right leg, also presented here with anemia requiring transfusion.  He said \"they never found out why\".  He is here with his son, Fortino.  He is intoxicated.  He states he is here for his headache.    REVIEW OF SYSTEMS    Neurologic: no LOC, No hearing loss  Cardiac: No Chest Pain, no syncope  Respiratory: has a chronic  Cough for a year or difficulty breathing  GI: No Bloody Stool or Diarrhea  : No Dysuria or Hematuria  General: No Fever  All other systems reviewed and are negative.    PAST MEDICAL & SURGICAL HISTORY    No past medical history on file.  Past Surgical History:   Procedure Laterality Date     BIOPSY MUSCLE DIAGNOSTIC (LOCATION) Right 5/23/2017    Procedure: BIOPSY MUSCLE DIAGNOSTIC (LOCATION);  MUSCLE BIOPSY RIGHT LOWER EXTREMITY;  Surgeon: Lauri Schaefer DO;  Location: [facility]     ESOPHAGOSCOPY, GASTROSCOPY, DUODENOSCOPY (EGD), COMBINED N/A 5/19/2017    Procedure: COMBINED ESOPHAGOSCOPY, GASTROSCOPY, DUODENOSCOPY (EGD);  Upper Endoscopy with biopsies;  Surgeon: July Mejias DO;  Location: [facility]       CURRENT MEDICATIONS    Current Outpatient Rx   Medication Sig Dispense Refill     predniSONE (DELTASONE) 20 MG tablet Take two tablets (= 40mg) each day for 5 (five) days " 10 tablet 0     HYDROcodone-acetaminophen (NORCO) 5-325 MG per tablet Take 1-2 tablets by mouth every 4 hours as needed for moderate to severe pain 15 tablet 0     Ipratropium-Albuterol (COMBIVENT RESPIMAT)  MCG/ACT inhaler Inhale 1 puff into the lungs 4 times daily as needed for shortness of breath / dyspnea or wheezing Not to exceed 6 doses per day. 1 Inhaler 1     METOPROLOL TARTRATE PO Take 50 mg by mouth daily       traMADol (ULTRAM) 50 MG tablet Take 50 mg by mouth every 8 hours as needed for moderate pain       OMEPRAZOLE PO Take 20 mg by mouth every morning Take on an empty stomach, at least 30 minutes prior to a meal for stomach acid.       THIAMINE HCL PO Take 100 mg by mouth daily         ALLERGIES    Allergies   Allergen Reactions     Bee Venom      Codeine        SOCIAL & FAMILY HISTORY    Social History     Social History     Marital status:      Spouse name: N/A     Number of children: N/A     Years of education: N/A     Social History Main Topics     Smoking status: Former Smoker     Packs/day: 2.00     Years: 41.00     Types: Clove cigarettes or kreteks     Start date: 5/19/1967     Quit date: 5/13/2017     Smokeless tobacco: Not on file     Alcohol use Yes     Drug use: Not on file     Sexual activity: Not on file     Other Topics Concern     Not on file     Social History Narrative     No family history on file.    PHYSICAL EXAM    VITAL SIGNS: /65  Pulse 69  Temp 97.6  F (36.4  C) (Oral)  Resp 22  Wt 72.1 kg (159 lb)  SpO2 98%  BMI 25.66 kg/m2   Constitutional:  Disheveled male, smells of alcohol, alert, normal mental status although drunk.  Eyes: Pupils equally round and react to light, sclera nonicteric  HENT:  Atraumatic, no trismus, he is exquisitely tender over the left temporal artery.  No swelling noted.  Neck: supple, no JVD, no posterior neck tenderness  Respiratory:  Lungs coarse, no retractions   Cardiovascular:  regular rate, no murmurs  GI:  Soft, nontender,  normal bowel sounds  Musculoskeletal:  No edema, no bruising  Vascular:  all pulses are 2+ equal bilaterally  Integument:  Well hydrated, no petechiae   Neurologic:  Alert & oriented, no slurred speech  Psych: Pleasant affect, no hallucinations    [facility]    [facility]    RADIOLOGY  (read by the radiologist)  CT head negative for injury  ED COURSE & MEDICAL DECISION MAKING    Pertinent Labs & Imaging studies reviewed and interpreted. (See chart for details)    See chart for details of medications given during the ED stay.    Vitals:    11/21/17 1714 11/21/17 1929 11/21/17 1930 11/21/17 1931   BP:  130/65     Pulse:       Resp:       Temp:    97.6  F (36.4  C)   TempSrc:    Oral   SpO2:   98%    Weight: 72.1 kg (159 lb)              FINAL IMPRESSION    1. Temporal arteritis (H)        PLAN  He has normal ESR but clinically has temporal arteritis.  He wants to leave, he is given a prednisone dose of 40 mg and a prescription sent to [facility].  [facility] and we clarified diagnosis to bring to them.  They can arrange for temporal artery biopsy and continuation of treatment. He reports a several year history of cough and shortness of breath, certainly made worse by his smoking and drinking.  He had normal exam here and normal oxygen saturations.     Belinda Brown MD  11/21/17 1953

## 2017-11-22 NOTE — ED NOTES
Face to face report given with opportunity to observe patient.    Report given to CODY Zamora.    Shabnam Bear   11/21/2017  7:06 PM

## 2017-11-22 NOTE — DISCHARGE INSTRUCTIONS
Temporal Arteritis  You have temporal arteritis (also called  giant cell arteritis ). This is an inflammation of the blood vessels that supply the head, neck, upper body, and arms. It can be diagnosed by examining a small piece of temporal artery, which is easily accessible. This artery is located in the scalp area just above each ear. When the arteries are inflamed, the vessel narrows, and blood flow slows down. A clot may also form inside the artery, stopping all blood flow. Reduced or blocked blood flow in the arteries that supply vital structures in the eye can cause blindness in the affected eye. In rare cases, stroke may occur.  The cause of temporal arteritis is not known. Symptoms of temporal arteritis include headache, tenderness of the temples or scalp, jaw pain when chewing, muscle aches, fatigue, fever, and unintentional weight loss.  Steroids, such as prednisone, are used to treat this condition. These reduce inflammation in the arteries. Most people begin to feel better a few days after treatment starts. Most people do recover from this condition, but it may require continued treatment for 1 or 2 years. Long-term steroid treatment can cause various serious side effects. These include osteoporosis, high blood pressure, and diabetes. Talk to your healthcare provider about side effects and ways to minimize them.  Home care  Take medicines as directed by your healthcare provider. The following suggestions will help reduce side effects from long-term steroid use.  Nutrition    Eat plenty of fresh fruits, vegetables, and whole grains.    Choose mostly lean sources of protein.    Limit the use of salt, sugar, and alcohol.    Be sure to get enough calcium and vitamin D. Low-fat dairy foods are an excellent source of these nutrients. If you are not able to eat dairy, you can choose lactose-free products instead. Here are some other foods that contain calcium:    Kale and broccoli    White beans, green beans,  and lima beans    [place]    Soybeans or tofu    Fortified juices    You can also take daily calcium supplements. Ask your healthcare provider how much calcium you should take each day.  Exercise  Get regular aerobic exercise, up to 30 minutes on most days. Exercise can help prevent bone loss, heart disease, and diabetes. It also relieves stress and can improve your mood and your overall quality of life. If you don t already exercise regularly, ask your healthcare provider for help setting up a program.  Follow-up care  Follow up with your healthcare provider, or as advised.  For more information about temporal arteritis, see:    National [place] of Arthritis and Musculoskeletal and Skin Diseases, www.niams.nih.gov    The Arthritis Foundation, www.arthritis.org  When to seek medical advice  Call your healthcare provider right away if any of these occur:    Worsening symptoms    Numbness or weakness of the face, one arm, or one leg    Slurred speech, confusion, or trouble speaking or walking    Severe headache    Fainting spell, dizziness, or seizure    Pain in the chest, arm, neck, or upper back    Sudden loss or change in vision  Date Last Reviewed: 6/14/2015 2000-2017 [employer]. [address] [place] [address]. All rights reserved. This information is not intended as a substitute for professional medical care. Always follow your healthcare professional's instructions.      [facility]  Tell them that we started treatment for temporal arteritis.  I have started therapy with prednisone and sent a presciption to [facility] that you can start tomorrow.

## 2017-11-22 NOTE — ED NOTES
Pt discharged to home with son, pt is aware script will be avail at [facility] and has papers via [facility]

## 2018-03-09 ENCOUNTER — DOCUMENTATION ONLY (OUTPATIENT)
Dept: FAMILY MEDICINE | Facility: OTHER | Age: 58
End: 2018-03-09

## 2021-01-01 ENCOUNTER — HOSPITAL ENCOUNTER (EMERGENCY)
Facility: OTHER | Age: 61
End: 2021-11-16
Attending: FAMILY MEDICINE | Admitting: FAMILY MEDICINE
Payer: COMMERCIAL

## 2021-01-01 DIAGNOSIS — I46.9 CARDIAC ARREST (H): ICD-10-CM

## 2021-01-01 PROCEDURE — 96374 THER/PROPH/DIAG INJ IV PUSH: CPT | Mod: XU | Performed by: FAMILY MEDICINE

## 2021-01-01 PROCEDURE — 99285 EMERGENCY DEPT VISIT HI MDM: CPT | Mod: 25 | Performed by: FAMILY MEDICINE

## 2021-01-01 PROCEDURE — 92950 HEART/LUNG RESUSCITATION CPR: CPT | Performed by: FAMILY MEDICINE

## 2021-01-01 PROCEDURE — 31500 INSERT EMERGENCY AIRWAY: CPT | Performed by: NURSE ANESTHETIST, CERTIFIED REGISTERED

## 2021-01-01 PROCEDURE — 31500 INSERT EMERGENCY AIRWAY: CPT | Performed by: FAMILY MEDICINE

## 2021-09-13 NOTE — DISCHARGE SUMMARY
"Subjective:        Patient ID: Agustina Mckinley is a 61 y.o. female.     Chief Complaint: Back Pain     HPI      61 y.o. Female with 3-day history of lower back pain she noticed after helping a coworker move a box of paper. States she felt like she "pulled something" and declined to move a second box. She was fine throughout that day, though her back started hurting more. Pain worsened over weekend, and was mildly relieved with Aleve. Today, she went to work and took a Flexeril given to her by a friend; she reports great relief with this medication. She does not have a h/o back pain and has not had an episode like this in the past.     Review of Systems   Constitutional: Negative for appetite change, chills, fever and unexpected weight change.   HENT: Negative for congestion, sneezing and sore throat.   Eyes: Negative for photophobia and visual disturbance.   Respiratory: Negative for cough, chest tightness, shortness of breath and wheezing.   Cardiovascular: Negative for chest pain and palpitations.   Gastrointestinal: Negative for blood in stool, diarrhea, nausea and vomiting.   Genitourinary: Negative for dysuria and hematuria.   Musculoskeletal: Positive for back pain. Negative for arthralgias, joint swelling and myalgias.   Neurological: Negative for dizziness, syncope and headaches.   Psychiatric/Behavioral: Negative for dysphoric mood.   All other systems reviewed and are negative.      Objective:      Physical Exam   Constitutional: She is oriented to person, place, and time. She appears well-developed and well-nourished. No distress.   HENT:   Head: Normocephalic and atraumatic.   Mouth/Throat: Oropharynx is clear and moist. No oropharyngeal exudate.   Eyes: Conjunctivae and EOM are normal. Pupils are equal, round, and reactive to light. No scleral icterus.   Neck: Normal range of motion. Neck supple.   Cardiovascular: Normal rate, regular rhythm, normal heart sounds and intact distal pulses. Exam reveals no " [facility]    Discharge Summary  Hospitalist    Date of Admission:  5/17/2017  Date of Discharge:  5/24/2017  Discharging Provider: Tim Kam  Date of Service (when I saw the patient): 05/24/17    Discharge Diagnoses   Acute blood loss anemia.  Probable gastritis without significant findings on esophagogastroduodenoscopy.  Normocytic anemia.  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  Mild to moderate chronic obstructive pulmonary disease with chronic bronchitis and radiographic emphysema.  Cognitive dysfunction with confabulation and limitation in executive function.  Generalized weakness  Possible mild to moderate protein calorie malnutrition    History of Present Illness   Abelardo Escobar is a 56 year old  man who was admitted on 5/17/2017. After, in particular, family noted increasing pallor and weakness over the past several days. He is also noted intermittent dark stools.  Although he had not noted any change in the odor of stools .  A marked melenic odor is noted.  On examining the patient.  Interval history is significant in that the patient reports 4 the last 2 months that he has noticed the insidious onset of shortness of breath and weakness, more noticeable with activity. The patient also noticed in the last several weeks intermittent dark stools. He notes that beginning approximately one month ago he noted a discrete bullous lesion centrally just above the his perineum.  He was able to express dark material from this.  Rapidly after that, beginning within 1-2 days and progressively since then he has had violaceous discoloration of his thighs bilaterally associated with pain on movement and firmness. Late in April.  He was evaluated first at [facility] and subsequently transferred to [facility] in [place].  Based on review of records evaluation centered around his noting sharp left lateral chest wall pain .  Evaluation for acute coronary syndrome was unrevealing.  He underwent nuclear  "stress test which demonstrated no active ischemia.  Lower extremity duplex showed no evidence of thromboembolic disease.  Skin discoloration was noted, which was then felt to represent an ecchymosis and possibly occult trauma.  Since that time, he was admitted there approximately 4 days and discharged.  He subsequently presented to this emergency department several days later with persistent dyspnea.  Evaluation included  Chest CT in \"angiogram\" protocol which demonstrated no evidence of thromboembolic disease. Radiographic emphysema was noted.  He was prescribed a bronchodilator which he has used with mild response, but he has remained quite dyspneic.  He is, however, most bothered by the pain, firmness, and discoloration of his thighs bilaterally.   [facility] was noted to be acutely anemic.  He was transferred to emergency department here and admitted for further evaluation and management.     Hospital Course   Abelardo Escobar was admitted on 5/17/2017.  The following problems were addressed during his hospitalization:        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 Evaluation included magnetic resonance imaging which shows images, after consultation with the radiologist, felt to be quite consistent with dermatomyositis with inflammation in both thigh muscle groups, as well as in some of the muscle groups of the pelvis. The location of his presentation skin findings on the medial thighs is not \"classic\" with bilateral lateral thigh location more typical. Of note, he developed similar findings in the right lateral thigh during this hospitalization.  Also of interest and somewhat difficult to explain given the lack of any specific treatment is decrease in the degree of myalgias and both intensity of skin discoloration as well as muscle " gallop and no friction rub.   No murmur heard.  Pulmonary/Chest: Effort normal and breath sounds normal. No respiratory distress. She has no wheezes. She has no rales.   Abdominal: Soft. Bowel sounds are normal. She exhibits no distension. There is no tenderness. There is no guarding.   Musculoskeletal: She exhibits no edema, tenderness or deformity.   No spinal tenderness. L3/4 Right-sided paraspinal tenderness, pain reproducible with pressure   Lymphadenopathy:   She has no cervical adenopathy.   Neurological: She is alert and oriented to person, place, and time. No cranial nerve deficit.   Lower extremities: 5/5 strength, neurovascularly intact with no deficits noted   Skin: Skin is warm and dry. No rash noted. She is not diaphoretic.   Psychiatric: She has a normal mood and affect. Her behavior is normal.   Vitals reviewed.      Assessment:       1. Acute low back pain without sciatica, unspecified back pain laterality        Plan:       Agustina was seen today for back pain.     Diagnoses and all orders for this visit:     Acute low back pain without sciatica, unspecified back pain laterality  - cyclobenzaprine (FLEXERIL) 5 MG tablet; Take 1 tablet (5 mg total) by mouth 3 (three) times daily as needed for Muscle spasms.  - ketorolac injection 60 mg; Inject 2 mLs (60 mg total) into the muscle one time.        induration during this hospitalization dermatomyositis, however, remains the leading concern, although it is difficult to explain the lack of elevation in CK..  His other findings including pain and weakness are quite consistent with this.   He also reports dyspnea.  While this may be consistent with chronic obstructive airways disease or deconditioning.  This also may reflect neuromuscular weakness in respiratory muscles quite typical with neuromuscular pathology. Bedside PFTs were done with mild obstructive airway disease noted, but MIP and MEP reduced at  -40 cm H20 and 50 cm H20 respectively. Difficult to explain his dyspnea on this basis only.  However, perhaps a combination of airflow obstruction, anemia, and neuromuscular weakness could be an explanation. Doubt interstitial lung involvement, although this is possible.  Chest x-ray and chest CT angio unrevealing. ESR 47 and CRP 34.  Discussed by telephone with several rheumatologist.  The lack of elevation in CK is concerning and the possibility of alternative diagnoses must be considered.  On this basis and because he shown some spontaneous improvement.  Preemptive steroid treatment, not begun.  Multiple serologies pending.  JAYLA, low titer. Muscle biopsy performed 5/23 with pathology pending. In addition to usual serologies directed at dermatomyositis and polymyositis, HMG CR antibody obtain.  As noted, titer of JAYLA low. Outpatient rheumatology consultation appears indicated.    2.  Gastrointestinal hemorrhage.  Presentation with anemia and a history of intermittent dark stools.  Esophagogastroduodenoscopy was unrevealing, although on visual inspection is during endoscopy several abnormal areas of mucosa were identified.  Pathology was unrevealing. He does carry a history of peptic ulcer disease.  He also notes epigastric discomfort generally happening after eating and relieved with fairly regular use of milk. Of interest, these symptoms have not been present  during this hospitalization.  Have continued PPI using pantoprazole while inpatient.  Resume his usual omeprazole at the time of discharge.  Depending on other investigations and findings, particularly as the dermatomyositis may be a paraneoplastic phenomenon, evaluation with colonoscopy in the future may be considered. He did receive transfusion of 2 units of packed red blood cells with a favorable response in his hemoglobin level.  Hemoglobin remains stable.    3.  Chronic obstructive pulmonary disease  Prolonged history of tobacco abuse and, certainly on clinical grounds, he appeared to at least have a moderate to severe degree of airflow obstruction.  This is supported by bedside PFT results with FEV1 1.95 (59% predicted), FVC 4.06 (95% predicted) and FEV1/FVC 48 (63% predicted).  For the time being the best approach may be to continue short acting bronchodilators on an as-needed basis.  Radiographic emphysema also presen.  Given multiple other issues at the present time, further investigation may not be fruitful.  Empiric trial of Combivent on an as-needed basis, however, is not unreasonable.    4.  Aortic stenosis.  Mild aortic stenosis noted on recent echocardiogram.  Continuing metoprolol.  This also may be beneficial in terms of treatment of primary hypertension,, although admittedly not the agent choice for hypertension alone.    5.  Generalized weakness.  Some improvement during this hospitalization with physical therapy.  This suggested a degree of his weakness is on the basis of deconditioning.  As above, significant degree of his  weakness connected with some type of myopathy or neuromuscular disorder.  He has shown an improved exercise tolerance, initially felt possibly on the basis of transfusion alone.   PT evaluation is ongoing.  He would benefit from SNF for discharge planning given his profound weakness and limited exercise capacity. Has made improvement, now walking 300 ft with FWW, while before  he could hardly ambulate to bathroom. A skilled nursing facility placement.  Still recommended.  The patient is quite anxious to attempt a trial of therapy at home with home physical therapy.     6.  Cognitive dysfunction.  Cognitive evaluation has shown limitation in executive function, impulsiveness, and confabulation.  Unfortunately, few specific interventions seem indicated.  Continued close outpatient follow-up would certainly be beneficial.   The patient relates increasing memory problems.  He is quite frustrated with multiple hospitalizations and medical system involvement with an impression that he is not been able to understand events and explanations. Responses somewhat slowed. Cognitive eval completed today with impairment noted in short/long term memory, reasoning, organization and executive functioning.  Cannot rule out more acute metabolic encephalopathy related to other underlying acute illness, diagnosis which remains unclear at this point (see above). Also, may represent more chronic effect of alcohol induced toxic encephalopathy.     7.  Mild-to-moderate protein calorie malnutrition.  Difficult to determine any progressive weight loss or other findings.  Albumin decreased, although this may reflect diffuse inflammation more than anything else.  Pre-albumin on the day of discharge still pending.  Little excess visceral tissue.  He has had an excellent diet throughout this hospitalization and self-care limitation as well as home resources may be limited.  Best approach may be continued close outpatient follow-up.          Tim Kam    Significant Results and Procedures   EGD without significant findings on pathology.  Magnetic resonance imaging of bilateral thighs suggestive of dermatomyositis.  MIP and MIP suggestive of orderline neuromuscular weakness    Pending Results   [facility]  Unresulted Labs Ordered in the Past 30 Days of this Admission     Date  "and Time Order Name Status Description    5/24/2017 1039 Prealbumin In process     5/19/2017 1305 [facility] Miscellaneous Test In process     5/19/2017 0529 Polymyositis and Dermatomyositis Panel In process           Code Status   Full Code       Primary Care Physician   So Baig    Vital signs:  Temp: 98.5  F (36.9  C) Temp src: Tympanic BP: (!) 121/48 Pulse: 75 Heart Rate: 76 Resp: 18 SpO2: 97 % O2 Device: None (Room air) Oxygen Delivery: 2 LPM Height: 167.6 cm (5' 6\") Weight: 67.8 kg (149 lb 7.6 oz)  Estimated body mass index is 24.13 kg/(m^2) as calculated from the following:    Height as of this encounter: 1.676 m (5' 6\").    Weight as of this encounter: 67.8 kg (149 lb 7.6 oz).        Awake, alert, mildly irritable man lying in bed on medical wards.  Speech is clear.  Oriented ×3.  HEENT: Pupils equal, conjugate. No icterus or nystagmus. Oral mucosa moist. No facial asymmetry.   Neck: Supple, jugular veins not elevated. Trachea midline   Chest: No chest wall movement asymmetry. Aeration minimally diminished at bases. Accessory muscles not in use. Expiratory time not increased. No tidal wheezes. Rare basilar rhonchi. No discrete crackles.   Cardiac: PMI not displaced. S1, S2 unremarkable. No S3, S4. P2 not accentuated. No murmurs. Carotid upstroke preserved. .   Abdomen: Soft. No palpation or percussion tenderness. No distention. Normoactive bowel sounds. Liver and spleen not increased in size. No bruits, masses, or pulsations.   Extremities: medial thighs bilaterally with mild dark discoloration.  Minimal induration.  More violaceous discoloration of right lateral thigh with mild induration.  Extremities warm distally.  Easily palpable peripheral pulses.  Brisk capillary refill.  Neurologic: Mental state above. Motor 5/5 and bilaterally equal. Tone preserved. No fasiculations or tremors. Sensation intact to light touch. DTR 2/4 and bilaterally equal.     Discharge Disposition   Discharged to home; " anticipated home physical therapy  Condition at discharge: Stable    Consultations This Hospital Stay   SURGERY GENERAL IP CONSULT  PHYSICAL THERAPY ADULT IP CONSULT  SPEECH LANGUAGE PATH ADULT IP CONSULT  RESPIRATORY CARE IP CONSULT    Time Spent on this Encounter   I, Tim Kam, personally saw the patient today and spent greater than 30 minutes discharging this patient.    Discharge Orders     Reason for your hospital stay   This is a 56-year-old man who presented with subacute exertional dyspnea and fatigue.  On initial evaluation, outpatient clinic, hemoglobin of 6.6 was noted with  The patient reporting intermittent dark stools.  Evaluation has included esophagogastroduodenoscopy, which was unrevealing.  He has as well  Pain in muscles on medial and during this hospitalization right lateral thighs.  Magnetic resonance imaging highly suggestive of dermatomyositis, although on repeat evaluation.  CK is not elevated.  Multiple serologies obtained with results pending.  JAYLA at low titer. Muscle biopsy obtained.  5/23 with pathology pending.  He is discharged with plans to continue home physical therapy as well as rheumatology follow-up.  In addition preemptive treatment for chronic obstructive pulmonary disease with imaging showing radiographic emphysema.     Follow-up and recommended labs and tests    Follow up with primary care provider, So Baig, within 7 days for hospital follow- up.   Subspecialty rheumatology evaluation recommended.  Consider pulmonary function testing in evaluation of airflow obstruction  Home physical therapy.  Given limitation in walking and generalized weakness     Activity   Your activity upon discharge: activity as tolerated     Full Code     Diet   Follow this diet upon discharge: Orders Placed This Encounter     Regular Diet Adult       Discharge Medications   Current Discharge Medication List      START taking these medications    Details   HYDROcodone-acetaminophen  (NORCO) 5-325 MG per tablet Take 1-2 tablets by mouth every 4 hours as needed for moderate to severe pain  Qty: 15 tablet, Refills: 0    Associated Diagnoses: Dermatomyositis (H); Acute post-operative pain      Ipratropium-Albuterol (COMBIVENT RESPIMAT)  MCG/ACT inhaler Inhale 1 puff into the lungs 4 times daily as needed for shortness of breath / dyspnea or wheezing Not to exceed 6 doses per day.  Qty: 1 Inhaler, Refills: 1    Associated Diagnoses: Emphysema with both acute and chronic bronchitis (H)      fluticasone-salmeterol (ADVAIR) 250-50 MCG/DOSE diskus inhaler Inhale 1 puff into the lungs 2 times daily  Qty: 1 Inhaler, Refills: 1    Associated Diagnoses: Emphysema with both acute and chronic bronchitis (H)         CONTINUE these medications which have NOT CHANGED    Details   METOPROLOL TARTRATE PO Take 50 mg by mouth daily      traMADol (ULTRAM) 50 MG tablet Take 50 mg by mouth every 8 hours as needed for moderate pain      OMEPRAZOLE PO Take 20 mg by mouth every morning Take on an empty stomach, at least 30 minutes prior to a meal for stomach acid.      THIAMINE HCL PO Take 100 mg by mouth daily      albuterol (ALBUTEROL) 108 (90 BASE) MCG/ACT Inhaler Inhale 2 puffs into the lungs every 4 hours as needed for shortness of breath / dyspnea  Qty: 1 Inhaler, Refills: 0           Allergies   Allergies   Allergen Reactions     Bee Venom      Codeine      Data   Most Recent 3 CBC's:  Recent Labs   Lab Test  05/24/17   0620  05/22/17   0527  05/21/17   0536   WBC  3.6*  3.1*  2.8*   HGB  10.6*  9.5*  9.1*   MCV  95  94  91   PLT  302  263  230      Most Recent 3 BMP's:  Recent Labs   Lab Test  05/24/17   0620  05/22/17   0527  05/21/17   0536   NA  136  137  139   POTASSIUM  4.3  4.1  3.9   CHLORIDE  101  104  104   CO2  27  26  24   BUN  8  7  10   CR  0.98  0.89  0.84   ANIONGAP  8  7  11   KENRICK  8.8  8.3*  8.1*   GLC  92  91  91     Most Recent 2 LFT's:  Recent Labs   Lab Test  05/18/17   0505  05/17/17    1444   AST  15  11   ALT  10  11   ALKPHOS  70  71   BILITOTAL  2.0*  1.4*     Most Recent INR's and Anticoagulation Dosing History:  Anticoagulation Dose History     Recent Dosing and Labs Latest Ref Rng & Units 5/17/2017 5/18/2017    INR 0.80 - 1.20 1.17 1.06        Most Recent 3 Troponin's:  Recent Labs   Lab Test  04/27/17   1315   TROPI  <0.015  The 99th percentile for upper reference range is 0.045 ug/L.  Troponin values in   the range of 0.045 - 0.120 ug/L may be associated with risks of adverse   clinical events.       Most Recent Cholesterol Panel:No lab results found.  Most Recent 6 Bacteria Isolates From Any Culture (See EPIC Reports for Culture Details):No lab results found.  Most Recent TSH, T4 and A1c Labs:No lab results found.  Results for orders placed or performed during the hospital encounter of 05/17/17   XR Chest 2 Views    Narrative    TWO VIEWS OF CHEST    CLINICAL HISTORY:  Shortness of breath.    COMPARISON:  Today's study is compared to a prior examination which is  dated April 27, 2017.    FINDINGS:  Cardiac silhouette and pulmonary vasculature are within  normal limits.  The lungs are similar in appearance. There is a  calcified nodule again seen within the right upper lobe, which has  been stable dating back to a chest angiogram from April 27, 2017 where  it has the appearance of a hamartoma.  Lungs are otherwise clear.    IMPRESSION:  NO EVIDENCE OF ACUTE OR ACTIVE DISEASE.  Exam Date: May 17, 2017 03:42:00 PM  Author: JORDYN HOLT  This report is final and signed     MR Pelvis w/o & w Contrast    Narrative    PELVIS MRI    TECHNIQUE:  Images were obtained axially and coronally T2 HASTE,  axially STIR and HASTE technique, axially T1-weighted with fat  saturation with and without gadolinium.    FINDINGS:  There is abnormal edema with enhancement seen symmetrically  involving the adductor muscle groups of both thighs.  Hamstring  muscles were also involved to a lesser extent.  The  quadriceps muscle  groups appear largely spared bilaterally.  In the pelvic girdle, there  is some involvement of the obturator externus muscle groups.  There is  some edema and enhancement of the subcutaneous tissues of both thighs,  both medially and laterally.  There is a small amount of enhancement  and edema in the gluteus tahmina muscle caudally on the right.  No  soft tissue masses are seen.  There are some ring-like areas of  altered signal seen in some of the muscle groups.  No bone marrow  abnormalities are noted.  In the pelvis, the bladder and rectum appear  normal.    IMPRESSION:  PATTERN OF INFLAMMATION IN BOTH THIGH MUSCLE GROUPS, AS  WELL AS IN SOME OF THE MUSCLE GROUPS OF THE PELVIS IS MOST CONSISTENT  WITH DERMATOMYOSITIS.  Exam Date: May 18, 2017 12:17:58 AM  Author: ADELAIDA SCHAFFER  This report is final and signed        Topical Clindamycin Counseling: Patient counseled that this medication may cause skin irritation or allergic reactions.  In the event of skin irritation, the patient was advised to reduce the amount of the drug applied or use it less frequently.   The patient verbalized understanding of the proper use and possible adverse effects of clindamycin.  All of the patient's questions and concerns were addressed.

## 2021-11-16 NOTE — ED TRIAGE NOTES
EMS Arrival Note  ________________________________  Abelardo Escobar is a 61 year old Male that arrives via [facility] ambulance service [facility]  Pre hospital clinical presentation per EMS personnel includes neighbor found patient in chair, cold. CPR started at 11:03 by law enforcement. ACLS meds started by Med[facility] at 11:26.   Patient remained asystole with cpr in progress.   Pre Hospital Cardiac rhythm reported as asystole  Pre hospital care included: Medication: epi x 4 and x1 bi-carb:,  Respiratory Support: Igel, Orthopedic Support CPR Federico    Patient arrives with:    Airway intact  Breathing Assessment Abnormal - CPR with Lucus  Circulation Assessment  ABNORMAL: asystole  Patient arrives with a IO IV at his left knee with 500 ml of normal saline infused upon arrival.    Placed in room E01, gowned, warm blanket provided, side rails up,  ID verified and band placed, and call light within reach.       Previous living situation unknown

## 2021-11-17 NOTE — ED PROVIDER NOTES
Patient arrives via EMS full cardiac arrest CPR in progress.    See nurses notes below.  Abelardo Escobar is a 61 year old Male that arrives via [facility] ambulance service [facility] [place]  Pre hospital clinical presentation per EMS personnel includes neighbor found patient in chair, cold. CPR started at 11:03 by law enforcement. ACLS meds started by Med[facility] 1 at 11:26.   Patient remained asystole with cpr in progress.   Pre Hospital Cardiac rhythm reported as asystole  Pre hospital care included: Medication: epi x 4 and x1 bi-carb:,  Respiratory Support: Igel, Orthopedic Support CPR Federico        Patient arrives with approximately 40 minutes of asystole.  I gel removed, intubated without difficulty x1.  Epi x1 in ED and circulated for 3 minutes.  At that time paused and checked for cardiac cavity.  Full cardiac standstill without pulses.  Time of death declared at 1208.           Hussein Mccauley MD  11/17/21 [id number]

## 2024-11-19 NOTE — PLAN OF CARE
Problem: Goal Outcome Summary  Goal: Goal Outcome Summary  Outcome: No Change  A&O on assessment, insists on going home tomorrow. VSS. Afebrile. C/o pain in right and left leg, administered Ultram with sleeping on recheck. On awakening Pt c/o continued pain but declines alternative medication. At end of shift requested Norco. Denies nausea. LS clear, on RA. BS active, small BM this shift. BLE are bruised with red blochiness noted, cms and pulses are good, denies numbness or tingling, edema noted which is > in the right than the left. Limiting movement as Pt reports BLE becoming hard to touch following ambulation on previous shifts. Speech therapy present this AM. Muscle biopsy tomorrow. Good oral intake and output Call light with in reach.    Face to face report given with opportunity to observe patient.    Report given to CODY Funes   5/22/2017  3:20 PM           [General Appearance - Well Developed] : well developed [General Appearance - Well Nourished] : well nourished [Normal Appearance] : normal appearance [Well Groomed] : well groomed [Edema] : no peripheral edema [] : no respiratory distress [Abdomen Soft] : soft [Penis Abnormality] : normal uncircumcised penis [Normal Station and Gait] : the gait and station were normal for the patient's age [Skin Color & Pigmentation] : normal skin color and pigmentation [No Focal Deficits] : no focal deficits [Oriented To Time, Place, And Person] : oriented to person, place, and time [Affect] : the affect was normal [Mood] : the mood was normal [Not Anxious] : not anxious [de-identified] : Large scar in suprapubic region from abdominoplasty infection [de-identified] : 30F urethral meatus, circumcised penis with large escutcheon that buries the first inch of the penis. He has enough length that this should not interfere w sex.

## (undated) DEVICE — LIGHT HANDLE COVER

## (undated) DEVICE — FORCEP-COLON C NEEDLE  SWING JAW  FB-220UA

## (undated) DEVICE — LUBRICANT JELLY 2OZ. TUBE

## (undated) DEVICE — CAUTERY PAD-POLYHESIVE II ADULT

## (undated) DEVICE — SWABSTICK-BENZOIN

## (undated) DEVICE — APPLICATOR-CHLORAPREP 26ML TINTED CHG 2%+ 70% IPA-SURGICAL

## (undated) DEVICE — LABEL-STERILE PREPRINTED FOR OR

## (undated) DEVICE — GLV-7.5 ORTHO PROTEXIS PI LF/PF

## (undated) DEVICE — FORCEP-COLON BIOPSY STD W/NEEDLE 160CM

## (undated) DEVICE — TOWEL-OR DISP 4PKS

## (undated) DEVICE — DRSG-ISLAND 4IN X 5IN

## (undated) DEVICE — IRRIGATION-NACL 1000ML

## (undated) DEVICE — MOUTHPIECE W/GUARD FOR ENDOSCOPY

## (undated) DEVICE — NDL-BLUNT FILL 18G 1.5"

## (undated) DEVICE — NDL-25G 1-1/2" NON-SAFETY

## (undated) DEVICE — GOWN-SURG XL LVL 3 REINFORCED

## (undated) DEVICE — STERI-STRIP-1/2" X 4"

## (undated) DEVICE — CANISTER-SUCTION 2000CC

## (undated) DEVICE — TUBING-SUCTION 20FT

## (undated) DEVICE — DRSG-SPONGE STERILE 4 X 4

## (undated) DEVICE — IRRIGATION-H2O 1000ML

## (undated) DEVICE — PACK-SET UP-CUSTOM

## (undated) DEVICE — SYRINGE-30CC SLIP TIP

## (undated) RX ORDER — PROPOFOL 10 MG/ML
INJECTION, EMULSION INTRAVENOUS
Status: DISPENSED
Start: 2017-05-19

## (undated) RX ORDER — LIDOCAINE HYDROCHLORIDE 20 MG/ML
INJECTION, SOLUTION EPIDURAL; INFILTRATION; INTRACAUDAL; PERINEURAL
Status: DISPENSED
Start: 2017-05-19